# Patient Record
Sex: FEMALE | Race: BLACK OR AFRICAN AMERICAN | NOT HISPANIC OR LATINO | Employment: FULL TIME | URBAN - METROPOLITAN AREA
[De-identification: names, ages, dates, MRNs, and addresses within clinical notes are randomized per-mention and may not be internally consistent; named-entity substitution may affect disease eponyms.]

---

## 2023-04-07 ENCOUNTER — OFFICE VISIT (OUTPATIENT)
Dept: PRIMARY CARE | Facility: CLINIC | Age: 63
End: 2023-04-07
Payer: COMMERCIAL

## 2023-04-07 ENCOUNTER — TELEPHONE (OUTPATIENT)
Dept: PRIMARY CARE | Facility: CLINIC | Age: 63
End: 2023-04-07

## 2023-04-07 VITALS
SYSTOLIC BLOOD PRESSURE: 120 MMHG | TEMPERATURE: 98.5 F | WEIGHT: 187.2 LBS | RESPIRATION RATE: 16 BRPM | HEART RATE: 70 BPM | DIASTOLIC BLOOD PRESSURE: 70 MMHG | BODY MASS INDEX: 36.56 KG/M2

## 2023-04-07 DIAGNOSIS — J32.9 CHRONIC SINUSITIS, UNSPECIFIED LOCATION: ICD-10-CM

## 2023-04-07 DIAGNOSIS — M16.10 HIP ARTHRITIS: ICD-10-CM

## 2023-04-07 DIAGNOSIS — Z79.4 TYPE 2 DIABETES MELLITUS WITH HYPERGLYCEMIA, WITH LONG-TERM CURRENT USE OF INSULIN (MULTI): ICD-10-CM

## 2023-04-07 DIAGNOSIS — E66.01 MORBID OBESITY (MULTI): ICD-10-CM

## 2023-04-07 DIAGNOSIS — E78.49 OTHER HYPERLIPIDEMIA: ICD-10-CM

## 2023-04-07 DIAGNOSIS — I10 PRIMARY HYPERTENSION: Primary | ICD-10-CM

## 2023-04-07 DIAGNOSIS — E11.65 TYPE 2 DIABETES MELLITUS WITH HYPERGLYCEMIA, WITH LONG-TERM CURRENT USE OF INSULIN (MULTI): ICD-10-CM

## 2023-04-07 PROBLEM — N39.46 MIXED INCONTINENCE: Status: ACTIVE | Noted: 2023-04-07

## 2023-04-07 PROBLEM — R23.2 HOT FLASHES: Status: ACTIVE | Noted: 2023-04-07

## 2023-04-07 PROBLEM — M72.2 BILATERAL PLANTAR FASCIITIS: Status: ACTIVE | Noted: 2023-04-07

## 2023-04-07 PROBLEM — F32.A DEPRESSION: Status: ACTIVE | Noted: 2023-04-07

## 2023-04-07 PROBLEM — M35.00 SJOGREN'S SYNDROME (MULTI): Status: ACTIVE | Noted: 2023-04-07

## 2023-04-07 PROBLEM — M06.9 RHEUMATOID ARTHRITIS (MULTI): Status: ACTIVE | Noted: 2023-04-07

## 2023-04-07 PROBLEM — E78.5 HYPERLIPIDEMIA: Status: ACTIVE | Noted: 2023-04-07

## 2023-04-07 PROBLEM — E55.9 VITAMIN D DEFICIENCY: Status: ACTIVE | Noted: 2023-04-07

## 2023-04-07 PROBLEM — H04.123 DRY EYES: Status: ACTIVE | Noted: 2023-04-07

## 2023-04-07 PROBLEM — E11.9 DIABETES MELLITUS, TYPE 2 (MULTI): Status: ACTIVE | Noted: 2023-04-07

## 2023-04-07 PROBLEM — M19.90 ARTHRITIS: Status: ACTIVE | Noted: 2023-04-07

## 2023-04-07 PROBLEM — G47.30 SLEEP APNEA: Status: ACTIVE | Noted: 2023-04-07

## 2023-04-07 PROCEDURE — 3074F SYST BP LT 130 MM HG: CPT | Performed by: INTERNAL MEDICINE

## 2023-04-07 PROCEDURE — 99214 OFFICE O/P EST MOD 30 MIN: CPT | Performed by: INTERNAL MEDICINE

## 2023-04-07 PROCEDURE — 3078F DIAST BP <80 MM HG: CPT | Performed by: INTERNAL MEDICINE

## 2023-04-07 RX ORDER — SEMAGLUTIDE 1.34 MG/ML
INJECTION, SOLUTION SUBCUTANEOUS
COMMUNITY
Start: 2022-06-21 | End: 2023-04-07 | Stop reason: ALTCHOICE

## 2023-04-07 RX ORDER — AMOXICILLIN 875 MG/1
1 TABLET, FILM COATED ORAL 2 TIMES DAILY
COMMUNITY
Start: 2022-12-20

## 2023-04-07 RX ORDER — PILOCARPINE HYDROCHLORIDE 5 MG/1
1 TABLET, FILM COATED ORAL 2 TIMES DAILY
COMMUNITY
Start: 2016-03-18

## 2023-04-07 RX ORDER — DICLOFENAC SODIUM 16.05 MG/ML
SOLUTION TOPICAL
COMMUNITY
Start: 2013-07-23

## 2023-04-07 RX ORDER — ATORVASTATIN CALCIUM 10 MG/1
10 TABLET, FILM COATED ORAL DAILY
COMMUNITY
End: 2023-04-24 | Stop reason: SDUPTHER

## 2023-04-07 RX ORDER — BLOOD-GLUCOSE METER
KIT MISCELLANEOUS 2 TIMES DAILY
COMMUNITY
Start: 2017-05-30

## 2023-04-07 RX ORDER — MIRABEGRON 25 MG/1
TABLET, FILM COATED, EXTENDED RELEASE ORAL
COMMUNITY
Start: 2021-12-17

## 2023-04-07 RX ORDER — COVID-19 ANTIGEN TEST
KIT MISCELLANEOUS
COMMUNITY
Start: 2022-04-22 | End: 2023-07-15

## 2023-04-07 RX ORDER — MOMETASONE FUROATE 50 UG/1
SPRAY, METERED NASAL
COMMUNITY
Start: 2019-03-19 | End: 2023-04-07 | Stop reason: SDUPTHER

## 2023-04-07 RX ORDER — FLUTICASONE PROPIONATE 50 MCG
1 SPRAY, SUSPENSION (ML) NASAL 2 TIMES DAILY
COMMUNITY

## 2023-04-07 RX ORDER — MOMETASONE FUROATE 50 UG/1
2 SPRAY, METERED NASAL DAILY
Qty: 17 G | Refills: 2 | Status: SHIPPED | OUTPATIENT
Start: 2023-04-07 | End: 2024-04-30 | Stop reason: SDUPTHER

## 2023-04-07 RX ORDER — OXYBUTYNIN CHLORIDE 5 MG/1
1 TABLET ORAL DAILY
COMMUNITY
Start: 2022-03-08

## 2023-04-07 RX ORDER — METFORMIN HYDROCHLORIDE 500 MG/1
500 TABLET ORAL 2 TIMES DAILY
COMMUNITY
End: 2023-04-07 | Stop reason: SDUPTHER

## 2023-04-07 RX ORDER — AMLODIPINE AND BENAZEPRIL HYDROCHLORIDE 10; 40 MG/1; MG/1
1 CAPSULE ORAL DAILY
Qty: 30 CAPSULE | Refills: 2 | Status: SHIPPED | OUTPATIENT
Start: 2023-04-07 | End: 2023-04-24 | Stop reason: SDUPTHER

## 2023-04-07 RX ORDER — AMLODIPINE AND BENAZEPRIL HYDROCHLORIDE 10; 40 MG/1; MG/1
1 CAPSULE ORAL DAILY
COMMUNITY
End: 2023-04-07 | Stop reason: SDUPTHER

## 2023-04-07 RX ORDER — METFORMIN HYDROCHLORIDE 500 MG/1
500 TABLET ORAL
Qty: 60 TABLET | Refills: 2 | Status: SHIPPED | OUTPATIENT
Start: 2023-04-07 | End: 2023-04-24 | Stop reason: SDUPTHER

## 2023-04-07 RX ORDER — TRIAMTERENE AND HYDROCHLOROTHIAZIDE 37.5; 25 MG/1; MG/1
1 CAPSULE ORAL DAILY
COMMUNITY
End: 2023-04-24 | Stop reason: SDUPTHER

## 2023-04-07 NOTE — PROGRESS NOTES
Adeola Moody is a 62 y.o. female   Patient with a past medical history of HTN, HLD, DM, RA, Sjogren, Depression, DJD, Colonoscopy in 2026, Mammogram (June)    Has lost weight with ozempic/ eating better         Review of Systems     Constitutional: no fever, no chills, not feeling poorly, not feeling tired and no recent weight gain, no recent weight loss.   ENT: no earache, no hearing loss, no nosebleeds, no nasal discharge, no sore throat and no hoarseness.   Cardiovascular: the heart rate was not slow, the heart rate was not fast, no chest pain, no palpitations, no intermittent leg claudication and no lower extremity edema.   Respiratory: no cough, wheezing or shortness of breath at rest or exertion  Gastrointestinal: no abdominal pain, no constipation, no melena, no nausea, no diarrhea, no vomiting and no blood in stools.   Musculoskeletal: no arthralgias, no myalgias, no back pain, no joint swelling, no joint stiffness, no limb pain and no limb swelling.   Integumentary: no skin rashes, no skin lesions, no itching, no skin wound and no dry skin.   Neurological: no headache, no confusion, no numbness, no dizziness, no tingling and no fainting.   All other systems have been reviewed and are negative for complaint.       Vitals:    04/07/23 0955   Temp: 36.9 °C (98.5 °F)        Physical Exam     Constitutional   General appearance: Alert and in no acute distress.     Pulmonary   Respiratory assessment: No respiratory distress, normal respiratory rhythm and effort.    Auscultation of Lungs: Clear bilateral breath sounds.   Cardiovascular   Auscultation of heart: Apical pulse normal, heart rate and rhythm normal, normal S1 and S2, no murmurs and no pericardial rub.    Exam for edema: No peripheral edema.   Abdomen   Abdominal Exam: No bruits, normal bowel sounds, soft, non-tender, no abdominal mass palpated.    Liver and Spleen exam: No hepato-splenomegaly.   Musculoskeletal   Examination of gait: Normal.     Inspection of digits and nails: No clubbing or cyanosis of the fingernails.    Inspection/palpation of joints, bones and muscles: No joint swelling. Normal movement of all extremities.   Skin   Skin inspection: Normal skin color and pigmentation, normal skin turgor and no visible rash.   Neurologic   Cranial nerves: Nerves 2-12 were intact, no focal neuro defects.     Assessment/Plan          Patient with a past medical history of HTN, HLD, DM, RA, Sjogren, Depression, DJD, Colonoscopy in 2026, Mammogram (June)        # DM/ Morbid Obesity  condition is stable  continue current medications        # HTN  condition is stable  continue current medications        # HLD  condition is stable  continue current medications       # Arthritis  Refer to water therapy  Blood work  f3m

## 2023-04-24 DIAGNOSIS — E78.49 OTHER HYPERLIPIDEMIA: Primary | ICD-10-CM

## 2023-04-24 DIAGNOSIS — I10 PRIMARY HYPERTENSION: ICD-10-CM

## 2023-04-24 DIAGNOSIS — E11.65 TYPE 2 DIABETES MELLITUS WITH HYPERGLYCEMIA, WITH LONG-TERM CURRENT USE OF INSULIN (MULTI): ICD-10-CM

## 2023-04-24 DIAGNOSIS — Z79.4 TYPE 2 DIABETES MELLITUS WITH HYPERGLYCEMIA, WITH LONG-TERM CURRENT USE OF INSULIN (MULTI): ICD-10-CM

## 2023-04-24 RX ORDER — ATORVASTATIN CALCIUM 10 MG/1
10 TABLET, FILM COATED ORAL DAILY
Qty: 90 TABLET | Refills: 0 | Status: SHIPPED | OUTPATIENT
Start: 2023-04-24 | End: 2023-10-23 | Stop reason: SDUPTHER

## 2023-04-24 RX ORDER — AMLODIPINE AND BENAZEPRIL HYDROCHLORIDE 10; 40 MG/1; MG/1
1 CAPSULE ORAL DAILY
Qty: 30 CAPSULE | Refills: 2 | Status: SHIPPED | OUTPATIENT
Start: 2023-04-24 | End: 2024-01-09 | Stop reason: SDUPTHER

## 2023-04-24 RX ORDER — TRIAMTERENE AND HYDROCHLOROTHIAZIDE 37.5; 25 MG/1; MG/1
1 CAPSULE ORAL DAILY
Qty: 90 CAPSULE | Refills: 0 | Status: SHIPPED | OUTPATIENT
Start: 2023-04-24 | End: 2023-07-17 | Stop reason: ALTCHOICE

## 2023-04-24 RX ORDER — METFORMIN HYDROCHLORIDE 500 MG/1
500 TABLET ORAL
Qty: 60 TABLET | Refills: 2 | Status: SHIPPED | OUTPATIENT
Start: 2023-04-24 | End: 2023-07-24

## 2023-04-26 ENCOUNTER — TELEPHONE (OUTPATIENT)
Dept: PRIMARY CARE | Facility: CLINIC | Age: 63
End: 2023-04-26

## 2023-04-26 ENCOUNTER — APPOINTMENT (OUTPATIENT)
Dept: LAB | Facility: LAB | Age: 63
End: 2023-04-26
Payer: COMMERCIAL

## 2023-04-26 DIAGNOSIS — K92.1 BLOOD IN STOOL: ICD-10-CM

## 2023-04-26 NOTE — TELEPHONE ENCOUNTER
Pt called in and stated that she needs a referral to Gastroentology for blood in her stool and stomach pains

## 2023-04-27 ENCOUNTER — LAB (OUTPATIENT)
Dept: LAB | Facility: LAB | Age: 63
End: 2023-04-27
Payer: COMMERCIAL

## 2023-04-27 DIAGNOSIS — E78.49 OTHER HYPERLIPIDEMIA: ICD-10-CM

## 2023-04-27 DIAGNOSIS — E11.65 TYPE 2 DIABETES MELLITUS WITH HYPERGLYCEMIA, WITH LONG-TERM CURRENT USE OF INSULIN (MULTI): ICD-10-CM

## 2023-04-27 DIAGNOSIS — I10 PRIMARY HYPERTENSION: ICD-10-CM

## 2023-04-27 DIAGNOSIS — Z79.4 TYPE 2 DIABETES MELLITUS WITH HYPERGLYCEMIA, WITH LONG-TERM CURRENT USE OF INSULIN (MULTI): ICD-10-CM

## 2023-04-27 PROCEDURE — 82570 ASSAY OF URINE CREATININE: CPT

## 2023-04-27 PROCEDURE — 80053 COMPREHEN METABOLIC PANEL: CPT

## 2023-04-27 PROCEDURE — 80061 LIPID PANEL: CPT

## 2023-04-27 PROCEDURE — 84443 ASSAY THYROID STIM HORMONE: CPT

## 2023-04-27 PROCEDURE — 83036 HEMOGLOBIN GLYCOSYLATED A1C: CPT

## 2023-04-27 PROCEDURE — 36415 COLL VENOUS BLD VENIPUNCTURE: CPT

## 2023-04-27 PROCEDURE — 85027 COMPLETE CBC AUTOMATED: CPT

## 2023-04-27 PROCEDURE — 82043 UR ALBUMIN QUANTITATIVE: CPT

## 2023-04-28 LAB
ALANINE AMINOTRANSFERASE (SGPT) (U/L) IN SER/PLAS: 22 U/L (ref 7–45)
ALBUMIN (G/DL) IN SER/PLAS: 4.4 G/DL (ref 3.4–5)
ALBUMIN (MG/L) IN URINE: 10.5 MG/L
ALBUMIN/CREATININE (UG/MG) IN URINE: 3.9 UG/MG CRT (ref 0–30)
ALKALINE PHOSPHATASE (U/L) IN SER/PLAS: 56 U/L (ref 33–136)
ANION GAP IN SER/PLAS: 12 MMOL/L (ref 10–20)
ASPARTATE AMINOTRANSFERASE (SGOT) (U/L) IN SER/PLAS: 18 U/L (ref 9–39)
BILIRUBIN TOTAL (MG/DL) IN SER/PLAS: 0.5 MG/DL (ref 0–1.2)
CALCIUM (MG/DL) IN SER/PLAS: 9.7 MG/DL (ref 8.6–10.6)
CARBON DIOXIDE, TOTAL (MMOL/L) IN SER/PLAS: 27 MMOL/L (ref 21–32)
CHLORIDE (MMOL/L) IN SER/PLAS: 105 MMOL/L (ref 98–107)
CHOLESTEROL (MG/DL) IN SER/PLAS: 195 MG/DL (ref 0–199)
CHOLESTEROL IN HDL (MG/DL) IN SER/PLAS: 47.3 MG/DL
CHOLESTEROL/HDL RATIO: 4.1
CREATININE (MG/DL) IN SER/PLAS: 0.83 MG/DL (ref 0.5–1.05)
CREATININE (MG/DL) IN URINE: 268 MG/DL (ref 20–320)
ERYTHROCYTE DISTRIBUTION WIDTH (RATIO) BY AUTOMATED COUNT: 13.8 % (ref 11.5–14.5)
ERYTHROCYTE MEAN CORPUSCULAR HEMOGLOBIN CONCENTRATION (G/DL) BY AUTOMATED: 32.6 G/DL (ref 32–36)
ERYTHROCYTE MEAN CORPUSCULAR VOLUME (FL) BY AUTOMATED COUNT: 95 FL (ref 80–100)
ERYTHROCYTES (10*6/UL) IN BLOOD BY AUTOMATED COUNT: 3.65 X10E12/L (ref 4–5.2)
ESTIMATED AVERAGE GLUCOSE FOR HBA1C: 120 MG/DL
GFR FEMALE: 79 ML/MIN/1.73M2
GLUCOSE (MG/DL) IN SER/PLAS: 75 MG/DL (ref 74–99)
HEMATOCRIT (%) IN BLOOD BY AUTOMATED COUNT: 34.7 % (ref 36–46)
HEMOGLOBIN (G/DL) IN BLOOD: 11.3 G/DL (ref 12–16)
HEMOGLOBIN A1C/HEMOGLOBIN TOTAL IN BLOOD: 5.8 %
LDL: 130 MG/DL (ref 0–99)
LEUKOCYTES (10*3/UL) IN BLOOD BY AUTOMATED COUNT: 5 X10E9/L (ref 4.4–11.3)
NRBC (PER 100 WBCS) BY AUTOMATED COUNT: 0 /100 WBC (ref 0–0)
PLATELETS (10*3/UL) IN BLOOD AUTOMATED COUNT: 222 X10E9/L (ref 150–450)
POTASSIUM (MMOL/L) IN SER/PLAS: 4.2 MMOL/L (ref 3.5–5.3)
PROTEIN TOTAL: 7.6 G/DL (ref 6.4–8.2)
SODIUM (MMOL/L) IN SER/PLAS: 140 MMOL/L (ref 136–145)
THYROTROPIN (MIU/L) IN SER/PLAS BY DETECTION LIMIT <= 0.05 MIU/L: 1.93 MIU/L (ref 0.44–3.98)
TRIGLYCERIDE (MG/DL) IN SER/PLAS: 89 MG/DL (ref 0–149)
UREA NITROGEN (MG/DL) IN SER/PLAS: 14 MG/DL (ref 6–23)
VLDL: 18 MG/DL (ref 0–40)

## 2023-06-01 ENCOUNTER — HOSPITAL ENCOUNTER (OUTPATIENT)
Dept: DATA CONVERSION | Facility: HOSPITAL | Age: 63
End: 2023-06-01
Attending: INTERNAL MEDICINE | Admitting: INTERNAL MEDICINE
Payer: COMMERCIAL

## 2023-06-01 DIAGNOSIS — Z88.2 ALLERGY STATUS TO SULFONAMIDES: ICD-10-CM

## 2023-06-01 DIAGNOSIS — K64.4 RESIDUAL HEMORRHOIDAL SKIN TAGS: ICD-10-CM

## 2023-06-01 DIAGNOSIS — I10 ESSENTIAL (PRIMARY) HYPERTENSION: ICD-10-CM

## 2023-06-01 DIAGNOSIS — Z86.010 PERSONAL HISTORY OF COLONIC POLYPS: ICD-10-CM

## 2023-06-01 DIAGNOSIS — M35.00 SJOGREN SYNDROME, UNSPECIFIED (MULTI): ICD-10-CM

## 2023-06-01 DIAGNOSIS — K64.8 OTHER HEMORRHOIDS: ICD-10-CM

## 2023-06-01 DIAGNOSIS — K62.5 HEMORRHAGE OF ANUS AND RECTUM: ICD-10-CM

## 2023-06-01 DIAGNOSIS — E11.9 TYPE 2 DIABETES MELLITUS WITHOUT COMPLICATIONS (MULTI): ICD-10-CM

## 2023-06-01 DIAGNOSIS — Z79.84 LONG TERM (CURRENT) USE OF ORAL HYPOGLYCEMIC DRUGS: ICD-10-CM

## 2023-06-01 DIAGNOSIS — K57.30 DIVERTICULOSIS OF LARGE INTESTINE WITHOUT PERFORATION OR ABSCESS WITHOUT BLEEDING: ICD-10-CM

## 2023-06-01 DIAGNOSIS — J45.909 UNSPECIFIED ASTHMA, UNCOMPLICATED (HHS-HCC): ICD-10-CM

## 2023-06-01 DIAGNOSIS — Z79.85 LONG-TERM (CURRENT) USE OF INJECTABLE NON-INSULIN ANTIDIABETIC DRUGS: ICD-10-CM

## 2023-06-01 DIAGNOSIS — R10.13 EPIGASTRIC PAIN: ICD-10-CM

## 2023-06-01 DIAGNOSIS — E66.01 MORBID (SEVERE) OBESITY DUE TO EXCESS CALORIES (MULTI): ICD-10-CM

## 2023-06-01 DIAGNOSIS — E78.5 HYPERLIPIDEMIA, UNSPECIFIED: ICD-10-CM

## 2023-06-01 DIAGNOSIS — G47.30 SLEEP APNEA, UNSPECIFIED: ICD-10-CM

## 2023-06-01 DIAGNOSIS — K92.1 MELENA: ICD-10-CM

## 2023-06-01 DIAGNOSIS — Z91.040 LATEX ALLERGY STATUS: ICD-10-CM

## 2023-06-01 LAB — POCT GLUCOSE: 90 MG/DL (ref 74–99)

## 2023-06-09 LAB
COMPLETE PATHOLOGY REPORT: NORMAL
CONVERTED CLINICAL DIAGNOSIS-HISTORY: NORMAL
CONVERTED FINAL DIAGNOSIS: NORMAL
CONVERTED FINAL REPORT PDF LINK TO COPY AND PASTE: NORMAL
CONVERTED GROSS DESCRIPTION: NORMAL

## 2023-07-15 PROBLEM — J18.9 CAP (COMMUNITY ACQUIRED PNEUMONIA): Status: ACTIVE | Noted: 2023-07-15

## 2023-07-15 PROBLEM — R32 URINARY INCONTINENCE: Status: ACTIVE | Noted: 2023-07-15

## 2023-07-15 PROBLEM — F41.9 ANXIETY: Status: ACTIVE | Noted: 2023-07-15

## 2023-07-15 PROBLEM — N95.1 PERIMENOPAUSE: Status: ACTIVE | Noted: 2023-07-15

## 2023-07-15 PROBLEM — F51.02 TRANSIENT INSOMNIA: Status: ACTIVE | Noted: 2023-07-15

## 2023-07-15 PROBLEM — R39.15 URINARY URGENCY: Status: ACTIVE | Noted: 2023-07-15

## 2023-07-15 PROBLEM — M77.8 TENDINITIS OF RIGHT SHOULDER: Status: ACTIVE | Noted: 2023-07-15

## 2023-07-15 PROBLEM — N95.0 PMB (POSTMENOPAUSAL BLEEDING): Status: ACTIVE | Noted: 2023-07-15

## 2023-07-15 PROBLEM — L30.9 DERMATITIS: Status: ACTIVE | Noted: 2023-07-15

## 2023-07-15 PROBLEM — R25.2 MUSCLE CRAMPS: Status: ACTIVE | Noted: 2023-07-15

## 2023-07-15 PROBLEM — M72.2 PLANTAR FASCIITIS: Status: ACTIVE | Noted: 2023-07-15

## 2023-07-15 PROBLEM — R74.01 TRANSAMINITIS: Status: ACTIVE | Noted: 2023-07-15

## 2023-07-15 PROBLEM — R51.9 CHRONIC HEADACHE: Status: ACTIVE | Noted: 2023-07-15

## 2023-07-15 PROBLEM — R20.2 NUMBNESS AND TINGLING OF FOOT: Status: ACTIVE | Noted: 2023-07-15

## 2023-07-15 PROBLEM — G89.29 CHRONIC HEADACHE: Status: ACTIVE | Noted: 2023-07-15

## 2023-07-15 PROBLEM — R73.9 ELEVATED BLOOD SUGAR: Status: ACTIVE | Noted: 2023-07-15

## 2023-07-15 PROBLEM — G57.00 PIRIFORMIS SYNDROME: Status: ACTIVE | Noted: 2023-07-15

## 2023-07-15 PROBLEM — R53.83 FATIGUE: Status: ACTIVE | Noted: 2023-07-15

## 2023-07-15 PROBLEM — J30.9 ALLERGIC RHINITIS: Status: ACTIVE | Noted: 2023-07-15

## 2023-07-15 PROBLEM — M54.30 ACUTE SCIATICA: Status: ACTIVE | Noted: 2023-07-15

## 2023-07-15 PROBLEM — R07.9 CHEST PAIN: Status: ACTIVE | Noted: 2023-07-15

## 2023-07-15 PROBLEM — N92.6 IRREGULAR MENSTRUAL CYCLE: Status: ACTIVE | Noted: 2023-07-15

## 2023-07-15 PROBLEM — N89.8 PRURITUS OF VAGINA: Status: ACTIVE | Noted: 2023-07-15

## 2023-07-15 PROBLEM — R76.8 POSITIVE ANA (ANTINUCLEAR ANTIBODY): Status: ACTIVE | Noted: 2023-07-15

## 2023-07-15 PROBLEM — R10.13 DYSPEPSIA: Status: ACTIVE | Noted: 2023-07-15

## 2023-07-15 PROBLEM — K62.5 BRBPR (BRIGHT RED BLOOD PER RECTUM): Status: ACTIVE | Noted: 2023-07-15

## 2023-07-15 PROBLEM — N89.8 VAGINAL DRYNESS: Status: ACTIVE | Noted: 2023-07-15

## 2023-07-15 PROBLEM — R20.0 NUMBNESS AND TINGLING OF FOOT: Status: ACTIVE | Noted: 2023-07-15

## 2023-07-15 PROBLEM — M70.60 TROCHANTERIC BURSITIS: Status: ACTIVE | Noted: 2023-07-15

## 2023-07-15 PROBLEM — N95.2 VAGINAL ATROPHY: Status: ACTIVE | Noted: 2023-07-15

## 2023-07-15 PROBLEM — R79.89 ELEVATED LIVER FUNCTION TESTS: Status: ACTIVE | Noted: 2023-07-15

## 2023-07-15 PROBLEM — N95.1 MENOPAUSAL SYMPTOMS: Status: ACTIVE | Noted: 2023-07-15

## 2023-07-15 RX ORDER — METHOCARBAMOL 750 MG/1
750 TABLET, FILM COATED ORAL 4 TIMES DAILY
COMMUNITY
Start: 2017-08-20

## 2023-07-15 RX ORDER — GABAPENTIN 100 MG/1
CAPSULE ORAL
COMMUNITY
Start: 2015-11-03

## 2023-07-15 RX ORDER — ESTRADIOL 0.1 MG/G
CREAM VAGINAL
COMMUNITY
Start: 2020-02-07

## 2023-07-15 RX ORDER — FUROSEMIDE 20 MG/1
TABLET ORAL
COMMUNITY
Start: 2015-11-01

## 2023-07-15 RX ORDER — NAPROXEN 500 MG/1
1 TABLET ORAL
COMMUNITY
Start: 2019-08-17

## 2023-07-15 RX ORDER — CITALOPRAM 40 MG/1
TABLET, FILM COATED ORAL
COMMUNITY
Start: 2012-05-27 | End: 2024-04-30 | Stop reason: ALTCHOICE

## 2023-07-17 ENCOUNTER — OFFICE VISIT (OUTPATIENT)
Dept: PRIMARY CARE | Facility: CLINIC | Age: 63
End: 2023-07-17
Payer: COMMERCIAL

## 2023-07-17 VITALS
DIASTOLIC BLOOD PRESSURE: 66 MMHG | BODY MASS INDEX: 36.01 KG/M2 | OXYGEN SATURATION: 100 % | SYSTOLIC BLOOD PRESSURE: 102 MMHG | HEART RATE: 88 BPM | WEIGHT: 184.4 LBS

## 2023-07-17 DIAGNOSIS — Z79.4 TYPE 2 DIABETES MELLITUS WITH HYPERGLYCEMIA, WITH LONG-TERM CURRENT USE OF INSULIN (MULTI): ICD-10-CM

## 2023-07-17 DIAGNOSIS — E78.49 OTHER HYPERLIPIDEMIA: Primary | ICD-10-CM

## 2023-07-17 DIAGNOSIS — E11.65 TYPE 2 DIABETES MELLITUS WITH HYPERGLYCEMIA, WITH LONG-TERM CURRENT USE OF INSULIN (MULTI): ICD-10-CM

## 2023-07-17 DIAGNOSIS — I10 PRIMARY HYPERTENSION: ICD-10-CM

## 2023-07-17 DIAGNOSIS — N39.3 URINE, INCONTINENCE, STRESS FEMALE: ICD-10-CM

## 2023-07-17 DIAGNOSIS — Z12.31 SCREENING MAMMOGRAM, ENCOUNTER FOR: ICD-10-CM

## 2023-07-17 PROCEDURE — 3078F DIAST BP <80 MM HG: CPT | Performed by: INTERNAL MEDICINE

## 2023-07-17 PROCEDURE — 3074F SYST BP LT 130 MM HG: CPT | Performed by: INTERNAL MEDICINE

## 2023-07-17 PROCEDURE — 3044F HG A1C LEVEL LT 7.0%: CPT | Performed by: INTERNAL MEDICINE

## 2023-07-17 PROCEDURE — 3008F BODY MASS INDEX DOCD: CPT | Performed by: INTERNAL MEDICINE

## 2023-07-17 PROCEDURE — 99214 OFFICE O/P EST MOD 30 MIN: CPT | Performed by: INTERNAL MEDICINE

## 2023-07-17 ASSESSMENT — PATIENT HEALTH QUESTIONNAIRE - PHQ9
2. FEELING DOWN, DEPRESSED OR HOPELESS: NOT AT ALL
SUM OF ALL RESPONSES TO PHQ9 QUESTIONS 1 AND 2: 0
1. LITTLE INTEREST OR PLEASURE IN DOING THINGS: NOT AT ALL

## 2023-07-17 NOTE — PROGRESS NOTES
Adeola Moody is a 63 y.o. female   Patient with a past medical history of HTN, HLD, DM, RA, Sjogren, Depression, DJD, Colonoscopy in 2030, Mammogram (June)    Has lost weight with ozempic/ eating better    Feels fine  No chest pain/  SOB/ dizziness  BM OK  Energy level ok  Appetite OK      No polyuria  No polydipsia  Nocturia            Review of Systems     Constitutional: no fever, no chills, not feeling poorly, not feeling tired and no recent weight gain, no recent weight loss.   ENT: no earache, no hearing loss, no nosebleeds, no nasal discharge, no sore throat and no hoarseness.   Cardiovascular: the heart rate was not slow, the heart rate was not fast, no chest pain, no palpitations, no intermittent leg claudication and no lower extremity edema.   Respiratory: no cough, wheezing or shortness of breath at rest or exertion  Gastrointestinal: no abdominal pain, no constipation, no melena, no nausea, no diarrhea, no vomiting and no blood in stools.   Musculoskeletal: no arthralgias, no myalgias, no back pain, no joint swelling, no joint stiffness, no limb pain and no limb swelling.   Integumentary: no skin rashes, no skin lesions, no itching, no skin wound and no dry skin.   Neurological: no headache, no confusion, no numbness, no dizziness, no tingling and no fainting.   All other systems have been reviewed and are negative for complaint.       Vitals:    07/17/23 1235   BP: 102/66   Pulse: 88   SpO2: 100%        Physical Exam     Constitutional   General appearance: Alert and in no acute distress.     Pulmonary   Respiratory assessment: No respiratory distress, normal respiratory rhythm and effort.    Auscultation of Lungs: Clear bilateral breath sounds.   Cardiovascular   Auscultation of heart: Apical pulse normal, heart rate and rhythm normal, normal S1 and S2, no murmurs and no pericardial rub.    Exam for edema: No peripheral edema.   Abdomen   Abdominal Exam: No bruits, normal bowel sounds, soft,  non-tender, no abdominal mass palpated.    Liver and Spleen exam: No hepato-splenomegaly.   Musculoskeletal   Examination of gait: Normal.    Inspection of digits and nails: No clubbing or cyanosis of the fingernails.    Inspection/palpation of joints, bones and muscles: No joint swelling. Normal movement of all extremities.   Skin   Skin inspection: Normal skin color and pigmentation, normal skin turgor and no visible rash.   Neurologic   Cranial nerves: Nerves 2-12 were intact, no focal neuro defects.     Assessment/Plan          Patient with a past medical history of HTN, HLD, DM, RA, Sjogren, Depression, DJD, Colonoscopy in 2030, Mammogram (June)        # DM/ Morbid Obesity  Continue Ozempic        # HTN  DC Maxzide as BP low        # HLD  condition is stable  continue current medications       # Arthritis  Stable    Refer to UroGyn for incontinence

## 2023-07-22 DIAGNOSIS — Z79.4 TYPE 2 DIABETES MELLITUS WITH HYPERGLYCEMIA, WITH LONG-TERM CURRENT USE OF INSULIN (MULTI): ICD-10-CM

## 2023-07-22 DIAGNOSIS — E11.65 TYPE 2 DIABETES MELLITUS WITH HYPERGLYCEMIA, WITH LONG-TERM CURRENT USE OF INSULIN (MULTI): ICD-10-CM

## 2023-07-24 RX ORDER — METFORMIN HYDROCHLORIDE 500 MG/1
500 TABLET ORAL
Qty: 60 TABLET | Refills: 2 | Status: SHIPPED | OUTPATIENT
Start: 2023-07-24 | End: 2023-10-23 | Stop reason: ALTCHOICE

## 2023-09-15 ENCOUNTER — TELEPHONE (OUTPATIENT)
Dept: PRIMARY CARE | Facility: CLINIC | Age: 63
End: 2023-09-15

## 2023-09-15 NOTE — TELEPHONE ENCOUNTER
PT called stating that she wanted to get an appointment today with Dr Teague, but he doesn't have anything until October and that none of the doctors have same day appointments available. 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  Advised the PT to go to urgent care.    She also needs a new handicap plaque prescription because her one now expires at the end of next month.

## 2023-10-05 DIAGNOSIS — E11.65 TYPE 2 DIABETES MELLITUS WITH HYPERGLYCEMIA, WITH LONG-TERM CURRENT USE OF INSULIN (MULTI): ICD-10-CM

## 2023-10-05 DIAGNOSIS — Z79.4 TYPE 2 DIABETES MELLITUS WITH HYPERGLYCEMIA, WITH LONG-TERM CURRENT USE OF INSULIN (MULTI): ICD-10-CM

## 2023-10-05 NOTE — TELEPHONE ENCOUNTER
Rx Refill Request Telephone Encounter    Name:  Adeola Moody  :  [id number]  Specific Pharmacy location:  [facility]

## 2023-10-09 DIAGNOSIS — Z79.4 TYPE 2 DIABETES MELLITUS WITH HYPERGLYCEMIA, WITH LONG-TERM CURRENT USE OF INSULIN (MULTI): ICD-10-CM

## 2023-10-09 DIAGNOSIS — E11.65 TYPE 2 DIABETES MELLITUS WITH HYPERGLYCEMIA, WITH LONG-TERM CURRENT USE OF INSULIN (MULTI): ICD-10-CM

## 2023-10-10 RX ORDER — SEMAGLUTIDE 1.34 MG/ML
1 INJECTION, SOLUTION SUBCUTANEOUS
Qty: 6 ML | Refills: 0 | Status: SHIPPED | OUTPATIENT
Start: 2023-10-10 | End: 2024-01-09 | Stop reason: SDUPTHER

## 2023-10-23 ENCOUNTER — OFFICE VISIT (OUTPATIENT)
Dept: PRIMARY CARE | Facility: CLINIC | Age: 63
End: 2023-10-23
Payer: COMMERCIAL

## 2023-10-23 VITALS
TEMPERATURE: 98.4 F | HEART RATE: 68 BPM | BODY MASS INDEX: 35.47 KG/M2 | DIASTOLIC BLOOD PRESSURE: 80 MMHG | WEIGHT: 181.6 LBS | SYSTOLIC BLOOD PRESSURE: 130 MMHG | RESPIRATION RATE: 16 BRPM

## 2023-10-23 DIAGNOSIS — Z79.4 TYPE 2 DIABETES MELLITUS WITH HYPERGLYCEMIA, WITH LONG-TERM CURRENT USE OF INSULIN (MULTI): ICD-10-CM

## 2023-10-23 DIAGNOSIS — E13.69 OTHER SPECIFIED DIABETES MELLITUS WITH OTHER SPECIFIED COMPLICATION, UNSPECIFIED WHETHER LONG TERM INSULIN USE (MULTI): ICD-10-CM

## 2023-10-23 DIAGNOSIS — E78.49 OTHER HYPERLIPIDEMIA: ICD-10-CM

## 2023-10-23 DIAGNOSIS — R92.8 ABNORMAL MAMMOGRAM: Primary | ICD-10-CM

## 2023-10-23 DIAGNOSIS — M16.10 HIP ARTHRITIS: ICD-10-CM

## 2023-10-23 DIAGNOSIS — E11.65 TYPE 2 DIABETES MELLITUS WITH HYPERGLYCEMIA, WITH LONG-TERM CURRENT USE OF INSULIN (MULTI): ICD-10-CM

## 2023-10-23 DIAGNOSIS — I10 PRIMARY HYPERTENSION: ICD-10-CM

## 2023-10-23 LAB
POC FINGERSTICK BLOOD GLUCOSE: 104 MG/DL (ref 70–100)
POC HDL CHOLESTEROL: 68 MG/DL (ref 0–50)
POC HEMOGLOBIN A1C: 5.9 % (ref 4.2–6.5)
POC LDL CHOLESTEROL: 112 MG/DL (ref 0–100)
POC NON-HDL CHOLESTEROL: 129 MG/DL (ref 0–130)
POC TOTAL CHOLESTEROL/HDL RATIO: 2.9 (ref 0–4.5)
POC TOTAL CHOLESTEROL: 197 MG/DL (ref 0–199)
POC TRIGLYCERIDES: 88 MG/DL (ref 0–150)

## 2023-10-23 PROCEDURE — 3075F SYST BP GE 130 - 139MM HG: CPT | Performed by: INTERNAL MEDICINE

## 2023-10-23 PROCEDURE — 3079F DIAST BP 80-89 MM HG: CPT | Performed by: INTERNAL MEDICINE

## 2023-10-23 PROCEDURE — 80061 LIPID PANEL: CPT | Performed by: INTERNAL MEDICINE

## 2023-10-23 PROCEDURE — 83036 HEMOGLOBIN GLYCOSYLATED A1C: CPT | Performed by: INTERNAL MEDICINE

## 2023-10-23 PROCEDURE — 99214 OFFICE O/P EST MOD 30 MIN: CPT | Performed by: INTERNAL MEDICINE

## 2023-10-23 PROCEDURE — 82962 GLUCOSE BLOOD TEST: CPT | Performed by: INTERNAL MEDICINE

## 2023-10-23 PROCEDURE — 3044F HG A1C LEVEL LT 7.0%: CPT | Performed by: INTERNAL MEDICINE

## 2023-10-23 RX ORDER — ATORVASTATIN CALCIUM 10 MG/1
20 TABLET, FILM COATED ORAL DAILY
Qty: 90 TABLET | Refills: 0 | Status: SHIPPED | OUTPATIENT
Start: 2023-10-23 | End: 2024-01-25 | Stop reason: ALTCHOICE

## 2023-10-23 NOTE — PROGRESS NOTES
Adeola Moody is a 63 y.o. female   Patient with a past medical history of HTN, HLD, DM, RA, Sjogren, Depression, DJD, Colonoscopy in 2030, Mammogram (June)    Has lost weight with ozempic/ eating better    Feels fine  No chest pain/  SOB/ dizziness  BM OK  Energy level ok  Appetite OK      No polyuria  No polydipsia  Nocturia            Review of Systems     Constitutional: no fever, no chills, not feeling poorly, not feeling tired and no recent weight gain, no recent weight loss.   ENT: no earache, no hearing loss, no nosebleeds, no nasal discharge, no sore throat and no hoarseness.   Cardiovascular: the heart rate was not slow, the heart rate was not fast, no chest pain, no palpitations, no intermittent leg claudication and no lower extremity edema.   Respiratory: no cough, wheezing or shortness of breath at rest or exertion  Gastrointestinal: no abdominal pain, no constipation, no melena, no nausea, no diarrhea, no vomiting and no blood in stools.   Musculoskeletal: no arthralgias, no myalgias, no back pain, no joint swelling, no joint stiffness, no limb pain and no limb swelling.   Integumentary: no skin rashes, no skin lesions, no itching, no skin wound and no dry skin.   Neurological: no headache, no confusion, no numbness, no dizziness, no tingling and no fainting.   All other systems have been reviewed and are negative for complaint.       Vitals:    10/23/23 1244   Temp: 36.9 °C (98.4 °F)        Physical Exam     Constitutional   General appearance: Alert and in no acute distress.     Pulmonary   Respiratory assessment: No respiratory distress, normal respiratory rhythm and effort.    Auscultation of Lungs: Clear bilateral breath sounds.   Cardiovascular   Auscultation of heart: Apical pulse normal, heart rate and rhythm normal, normal S1 and S2, no murmurs and no pericardial rub.    Exam for edema: No peripheral edema.   Abdomen   Abdominal Exam: No bruits, normal bowel sounds, soft, non-tender,  no abdominal mass palpated.    Liver and Spleen exam: No hepato-splenomegaly.   Musculoskeletal   Examination of gait: Normal.    Inspection of digits and nails: No clubbing or cyanosis of the fingernails.    Inspection/palpation of joints, bones and muscles: No joint swelling. Normal movement of all extremities.   Skin   Skin inspection: Normal skin color and pigmentation, normal skin turgor and no visible rash.   Neurologic   Cranial nerves: Nerves 2-12 were intact, no focal neuro defects.     Assessment/Plan          Patient with a past medical history of HTN, HLD, DM, RA, Sjogren, Depression, DJD, Colonoscopy in 2030, Mammogram (June)        # DM/ Morbid Obesity  Continue Ozempic  Has lost weight  Does get occasional stomach discomfort  DC Metfromin        # HTN  stable        # HLD  Elevated  Increase Atorva to 20 mg   Watch salt, avoid excessive caffeine  Avoid processed meats/ sugars/juices Instead eat fresh fruit  Add walnuts and almonds to your diet  exercise 6 days a week for 30 minutes     # Abnormal mammogram  Breast USG    # Arthritis  Stable    # Urinary Incontinence  Will see UroGYN

## 2023-11-22 ENCOUNTER — ANCILLARY PROCEDURE (OUTPATIENT)
Dept: RADIOLOGY | Facility: CLINIC | Age: 63
End: 2023-11-22
Payer: COMMERCIAL

## 2023-11-22 DIAGNOSIS — R92.8 ABNORMAL MAMMOGRAM: ICD-10-CM

## 2023-11-22 PROCEDURE — 76642 ULTRASOUND BREAST LIMITED: CPT | Mod: RT

## 2023-11-22 PROCEDURE — 76642 ULTRASOUND BREAST LIMITED: CPT | Mod: RIGHT SIDE | Performed by: STUDENT IN AN ORGANIZED HEALTH CARE EDUCATION/TRAINING PROGRAM

## 2023-11-22 PROCEDURE — 77065 DX MAMMO INCL CAD UNI: CPT | Mod: RIGHT SIDE | Performed by: STUDENT IN AN ORGANIZED HEALTH CARE EDUCATION/TRAINING PROGRAM

## 2023-11-22 PROCEDURE — 77061 BREAST TOMOSYNTHESIS UNI: CPT | Mod: RIGHT SIDE | Performed by: STUDENT IN AN ORGANIZED HEALTH CARE EDUCATION/TRAINING PROGRAM

## 2023-11-22 PROCEDURE — 77061 BREAST TOMOSYNTHESIS UNI: CPT | Mod: RT

## 2023-12-29 DIAGNOSIS — E78.49 OTHER HYPERLIPIDEMIA: ICD-10-CM

## 2023-12-29 RX ORDER — ATORVASTATIN CALCIUM 20 MG/1
20 TABLET, FILM COATED ORAL DAILY
Qty: 90 TABLET | Refills: 0 | Status: SHIPPED | OUTPATIENT
Start: 2023-12-29 | End: 2024-01-25 | Stop reason: SDUPTHER

## 2024-01-09 DIAGNOSIS — E11.65 TYPE 2 DIABETES MELLITUS WITH HYPERGLYCEMIA, WITH LONG-TERM CURRENT USE OF INSULIN (MULTI): ICD-10-CM

## 2024-01-09 DIAGNOSIS — Z79.4 TYPE 2 DIABETES MELLITUS WITH HYPERGLYCEMIA, WITH LONG-TERM CURRENT USE OF INSULIN (MULTI): ICD-10-CM

## 2024-01-09 DIAGNOSIS — I10 PRIMARY HYPERTENSION: ICD-10-CM

## 2024-01-09 RX ORDER — SEMAGLUTIDE 1.34 MG/ML
1 INJECTION, SOLUTION SUBCUTANEOUS
Qty: 6 ML | Refills: 0 | Status: SHIPPED | OUTPATIENT
Start: 2024-01-09 | End: 2024-01-25 | Stop reason: SDUPTHER

## 2024-01-09 RX ORDER — AMLODIPINE AND BENAZEPRIL HYDROCHLORIDE 10; 40 MG/1; MG/1
1 CAPSULE ORAL DAILY
Qty: 30 CAPSULE | Refills: 2 | Status: SHIPPED | OUTPATIENT
Start: 2024-01-09 | End: 2024-03-29 | Stop reason: SDUPTHER

## 2024-01-25 ENCOUNTER — OFFICE VISIT (OUTPATIENT)
Dept: PRIMARY CARE | Facility: CLINIC | Age: 64
End: 2024-01-25
Payer: COMMERCIAL

## 2024-01-25 VITALS
HEART RATE: 70 BPM | WEIGHT: 178.8 LBS | SYSTOLIC BLOOD PRESSURE: 130 MMHG | RESPIRATION RATE: 16 BRPM | BODY MASS INDEX: 34.92 KG/M2 | DIASTOLIC BLOOD PRESSURE: 70 MMHG

## 2024-01-25 DIAGNOSIS — E66.01 MORBID OBESITY (MULTI): ICD-10-CM

## 2024-01-25 DIAGNOSIS — I10 PRIMARY HYPERTENSION: ICD-10-CM

## 2024-01-25 DIAGNOSIS — J45.20 MILD INTERMITTENT ASTHMA WITHOUT COMPLICATION (HHS-HCC): Primary | ICD-10-CM

## 2024-01-25 DIAGNOSIS — Z79.4 TYPE 2 DIABETES MELLITUS WITH HYPERGLYCEMIA, WITH LONG-TERM CURRENT USE OF INSULIN (MULTI): ICD-10-CM

## 2024-01-25 DIAGNOSIS — E11.65 TYPE 2 DIABETES MELLITUS WITH HYPERGLYCEMIA, WITH LONG-TERM CURRENT USE OF INSULIN (MULTI): ICD-10-CM

## 2024-01-25 DIAGNOSIS — M06.9 RHEUMATOID ARTHRITIS, INVOLVING UNSPECIFIED SITE, UNSPECIFIED WHETHER RHEUMATOID FACTOR PRESENT (MULTI): ICD-10-CM

## 2024-01-25 DIAGNOSIS — E78.49 OTHER HYPERLIPIDEMIA: ICD-10-CM

## 2024-01-25 DIAGNOSIS — E13.69 OTHER SPECIFIED DIABETES MELLITUS WITH OTHER SPECIFIED COMPLICATION, UNSPECIFIED WHETHER LONG TERM INSULIN USE (MULTI): ICD-10-CM

## 2024-01-25 LAB
POC FINGERSTICK BLOOD GLUCOSE: 94 MG/DL (ref 70–100)
POC HDL CHOLESTEROL: 51 MG/DL (ref 0–50)
POC HEMOGLOBIN A1C: 5.9 % (ref 4.2–6.5)
POC LDL CHOLESTEROL: 104 MG/DL (ref 0–100)
POC NON-HDL CHOLESTEROL: 127 MG/DL (ref 0–130)
POC TOTAL CHOLESTEROL/HDL RATIO: 3.5 (ref 0–4.5)
POC TOTAL CHOLESTEROL: 178 MG/DL (ref 0–199)
POC TRIGLYCERIDES: 114 MG/DL (ref 0–150)

## 2024-01-25 PROCEDURE — 82962 GLUCOSE BLOOD TEST: CPT | Performed by: INTERNAL MEDICINE

## 2024-01-25 PROCEDURE — 3078F DIAST BP <80 MM HG: CPT | Performed by: INTERNAL MEDICINE

## 2024-01-25 PROCEDURE — 83036 HEMOGLOBIN GLYCOSYLATED A1C: CPT | Performed by: INTERNAL MEDICINE

## 2024-01-25 PROCEDURE — 3008F BODY MASS INDEX DOCD: CPT | Performed by: INTERNAL MEDICINE

## 2024-01-25 PROCEDURE — 1036F TOBACCO NON-USER: CPT | Performed by: INTERNAL MEDICINE

## 2024-01-25 PROCEDURE — 80061 LIPID PANEL: CPT | Performed by: INTERNAL MEDICINE

## 2024-01-25 PROCEDURE — 99214 OFFICE O/P EST MOD 30 MIN: CPT | Performed by: INTERNAL MEDICINE

## 2024-01-25 PROCEDURE — 3075F SYST BP GE 130 - 139MM HG: CPT | Performed by: INTERNAL MEDICINE

## 2024-01-25 RX ORDER — ATORVASTATIN CALCIUM 20 MG/1
20 TABLET, FILM COATED ORAL DAILY
Qty: 90 TABLET | Refills: 2 | Status: SHIPPED | OUTPATIENT
Start: 2024-01-25 | End: 2024-04-30 | Stop reason: SDUPTHER

## 2024-01-25 RX ORDER — SEMAGLUTIDE 1.34 MG/ML
1 INJECTION, SOLUTION SUBCUTANEOUS
Qty: 6 ML | Refills: 2 | Status: SHIPPED | OUTPATIENT
Start: 2024-01-25 | End: 2024-01-26

## 2024-01-25 NOTE — PROGRESS NOTES
Adeola Moody is a 63 y.o. female   Patient with a past medical history of HTN, HLD, DM, RA, Sjogren, Depression, DJD, Colonoscopy in 2030, Mammogram (June)    C/o lower back pain  Radiates down right buttock  Happens off and on    Feels fine  No chest pain/  SOB/ dizziness  BM OK  Energy level ok  Appetite OK      No polyuria  No polydipsia  Nocturia            Review of Systems     Constitutional: no fever, no chills, not feeling poorly, not feeling tired and no recent weight gain, no recent weight loss.   ENT: no earache, no hearing loss, no nosebleeds, no nasal discharge, no sore throat and no hoarseness.   Cardiovascular: the heart rate was not slow, the heart rate was not fast, no chest pain, no palpitations, no intermittent leg claudication and no lower extremity edema.   Respiratory: no cough, wheezing or shortness of breath at rest or exertion  Gastrointestinal: no abdominal pain, no constipation, no melena, no nausea, no diarrhea, no vomiting and no blood in stools.   Musculoskeletal: no arthralgias, no myalgias, no back pain, no joint swelling, no joint stiffness, no limb pain and no limb swelling.   Integumentary: no skin rashes, no skin lesions, no itching, no skin wound and no dry skin.   Neurological: no headache, no confusion, no numbness, no dizziness, no tingling and no fainting.   All other systems have been reviewed and are negative for complaint.       There were no vitals filed for this visit.       Physical Exam     Constitutional   General appearance: Alert and in no acute distress.     Pulmonary   Respiratory assessment: No respiratory distress, normal respiratory rhythm and effort.    Auscultation of Lungs: Clear bilateral breath sounds.   Cardiovascular   Auscultation of heart: Apical pulse normal, heart rate and rhythm normal, normal S1 and S2, no murmurs and no pericardial rub.    Exam for edema: No peripheral edema.   Abdomen   Abdominal Exam: No bruits, normal bowel sounds,  soft, non-tender, no abdominal mass palpated.    Liver and Spleen exam: No hepato-splenomegaly.   Musculoskeletal   Examination of gait: Normal.    Inspection of digits and nails: No clubbing or cyanosis of the fingernails.    Inspection/palpation of joints, bones and muscles: No joint swelling. Normal movement of all extremities.   Skin   Skin inspection: Normal skin color and pigmentation, normal skin turgor and no visible rash.   Neurologic   Cranial nerves: Nerves 2-12 were intact, no focal neuro defects.     Assessment/Plan          Patient with a past medical history of HTN, HLD, DM, RA, Sjogren, Depression, DJD, Colonoscopy in 2030, Mammogram (June)        # DM/ Morbid Obesity  A1c of 5.9%  Continue Ozempic  Has lost a little weight          # HTN  stable        # HLD  Cont Rx  Watch salt, avoid excessive caffeine  Avoid processed meats/ sugars/juices Instead eat fresh fruit  Add walnuts and almonds to your diet  exercise 6 days a week for 30 minutes     # Abnormal mammogram  Breast USG    # Rheumatoid Arthritis  Stable    # Urinary Incontinence  Will see UroGYN

## 2024-01-26 ENCOUNTER — TELEPHONE (OUTPATIENT)
Dept: PRIMARY CARE | Facility: CLINIC | Age: 64
End: 2024-01-26

## 2024-01-26 DIAGNOSIS — E11.65 TYPE 2 DIABETES MELLITUS WITH HYPERGLYCEMIA, WITH LONG-TERM CURRENT USE OF INSULIN (MULTI): ICD-10-CM

## 2024-01-26 DIAGNOSIS — Z79.4 TYPE 2 DIABETES MELLITUS WITH HYPERGLYCEMIA, WITH LONG-TERM CURRENT USE OF INSULIN (MULTI): ICD-10-CM

## 2024-01-26 RX ORDER — SEMAGLUTIDE 1.34 MG/ML
1 INJECTION, SOLUTION SUBCUTANEOUS
Qty: 3 ML | Refills: 0 | Status: SHIPPED | OUTPATIENT
Start: 2024-01-26 | End: 2024-02-26

## 2024-01-26 NOTE — TELEPHONE ENCOUNTER
PATIENT CALL STATED SHE NEEDS A REFERRAL TO SEE THE OPHTHALMOLOGIST SHE HAVE A FLOATERS IN HER EYES

## 2024-02-25 DIAGNOSIS — E11.65 TYPE 2 DIABETES MELLITUS WITH HYPERGLYCEMIA, WITH LONG-TERM CURRENT USE OF INSULIN (MULTI): ICD-10-CM

## 2024-02-25 DIAGNOSIS — Z79.4 TYPE 2 DIABETES MELLITUS WITH HYPERGLYCEMIA, WITH LONG-TERM CURRENT USE OF INSULIN (MULTI): ICD-10-CM

## 2024-02-26 RX ORDER — SEMAGLUTIDE 1.34 MG/ML
INJECTION, SOLUTION SUBCUTANEOUS
Qty: 3 ML | Refills: 1 | Status: SHIPPED | OUTPATIENT
Start: 2024-02-26 | End: 2024-03-11

## 2024-03-09 DIAGNOSIS — E11.65 TYPE 2 DIABETES MELLITUS WITH HYPERGLYCEMIA, WITH LONG-TERM CURRENT USE OF INSULIN (MULTI): ICD-10-CM

## 2024-03-09 DIAGNOSIS — Z79.4 TYPE 2 DIABETES MELLITUS WITH HYPERGLYCEMIA, WITH LONG-TERM CURRENT USE OF INSULIN (MULTI): ICD-10-CM

## 2024-03-11 RX ORDER — SEMAGLUTIDE 1.34 MG/ML
1 INJECTION, SOLUTION SUBCUTANEOUS
Qty: 3 ML | Refills: 0 | Status: SHIPPED | OUTPATIENT
Start: 2024-03-11 | End: 2024-04-08

## 2024-03-27 DIAGNOSIS — I10 PRIMARY HYPERTENSION: ICD-10-CM

## 2024-03-29 RX ORDER — AMLODIPINE AND BENAZEPRIL HYDROCHLORIDE 10; 40 MG/1; MG/1
1 CAPSULE ORAL DAILY
Qty: 30 CAPSULE | Refills: 0 | Status: SHIPPED | OUTPATIENT
Start: 2024-03-29 | End: 2024-04-30 | Stop reason: SDUPTHER

## 2024-03-29 NOTE — TELEPHONE ENCOUNTER
Rx Refill Request Telephone Encounter    Name:  Adeola Moody  :  [id number]  Specific Pharmacy location:  [facility]

## 2024-04-07 DIAGNOSIS — E11.65 TYPE 2 DIABETES MELLITUS WITH HYPERGLYCEMIA, WITH LONG-TERM CURRENT USE OF INSULIN (MULTI): ICD-10-CM

## 2024-04-07 DIAGNOSIS — Z79.4 TYPE 2 DIABETES MELLITUS WITH HYPERGLYCEMIA, WITH LONG-TERM CURRENT USE OF INSULIN (MULTI): ICD-10-CM

## 2024-04-08 RX ORDER — SEMAGLUTIDE 1.34 MG/ML
1 INJECTION, SOLUTION SUBCUTANEOUS
Qty: 3 ML | Refills: 3 | Status: SHIPPED | OUTPATIENT
Start: 2024-04-14 | End: 2024-04-30 | Stop reason: SDUPTHER

## 2024-04-30 ENCOUNTER — OFFICE VISIT (OUTPATIENT)
Dept: PRIMARY CARE | Facility: CLINIC | Age: 64
End: 2024-04-30
Payer: COMMERCIAL

## 2024-04-30 VITALS
HEART RATE: 70 BPM | BODY MASS INDEX: 36.21 KG/M2 | WEIGHT: 185.4 LBS | SYSTOLIC BLOOD PRESSURE: 130 MMHG | TEMPERATURE: 98.9 F | RESPIRATION RATE: 16 BRPM | DIASTOLIC BLOOD PRESSURE: 80 MMHG

## 2024-04-30 DIAGNOSIS — H10.13 ALLERGIC CONJUNCTIVITIS OF BOTH EYES: ICD-10-CM

## 2024-04-30 DIAGNOSIS — E13.69 OTHER SPECIFIED DIABETES MELLITUS WITH OTHER SPECIFIED COMPLICATION, UNSPECIFIED WHETHER LONG TERM INSULIN USE (MULTI): ICD-10-CM

## 2024-04-30 DIAGNOSIS — E78.49 OTHER HYPERLIPIDEMIA: ICD-10-CM

## 2024-04-30 DIAGNOSIS — E11.65 TYPE 2 DIABETES MELLITUS WITH HYPERGLYCEMIA, WITH LONG-TERM CURRENT USE OF INSULIN (MULTI): ICD-10-CM

## 2024-04-30 DIAGNOSIS — Z79.4 TYPE 2 DIABETES MELLITUS WITH HYPERGLYCEMIA, WITH LONG-TERM CURRENT USE OF INSULIN (MULTI): ICD-10-CM

## 2024-04-30 DIAGNOSIS — J32.9 CHRONIC SINUSITIS, UNSPECIFIED LOCATION: ICD-10-CM

## 2024-04-30 DIAGNOSIS — I10 PRIMARY HYPERTENSION: Primary | ICD-10-CM

## 2024-04-30 PROBLEM — K64.9 HEMORRHOIDS: Status: ACTIVE | Noted: 2024-04-30

## 2024-04-30 PROBLEM — K63.5 BENIGN COLONIC POLYP: Status: ACTIVE | Noted: 2024-04-30

## 2024-04-30 PROBLEM — Z86.0100 HISTORY OF COLONIC POLYPS: Status: ACTIVE | Noted: 2024-04-30

## 2024-04-30 PROBLEM — E66.9 OBESITY WITH BODY MASS INDEX 30 OR GREATER: Status: ACTIVE | Noted: 2024-04-30

## 2024-04-30 PROBLEM — K57.30 DIVERTICULOSIS OF LARGE INTESTINE: Status: ACTIVE | Noted: 2024-04-30

## 2024-04-30 PROBLEM — Z86.010 HISTORY OF COLONIC POLYPS: Status: ACTIVE | Noted: 2024-04-30

## 2024-04-30 PROBLEM — Z79.85 LONG-TERM (CURRENT) USE OF INJECTABLE NON-INSULIN ANTIDIABETIC DRUGS: Status: ACTIVE | Noted: 2024-04-30

## 2024-04-30 PROBLEM — J45.909 UNCOMPLICATED ASTHMA (HHS-HCC): Status: ACTIVE | Noted: 2024-04-30

## 2024-04-30 LAB
POC FINGERSTICK BLOOD GLUCOSE: 89 MG/DL (ref 70–100)
POC HEMOGLOBIN A1C: 6.1 % (ref 4.2–6.5)

## 2024-04-30 PROCEDURE — 3079F DIAST BP 80-89 MM HG: CPT | Performed by: INTERNAL MEDICINE

## 2024-04-30 PROCEDURE — 3075F SYST BP GE 130 - 139MM HG: CPT | Performed by: INTERNAL MEDICINE

## 2024-04-30 PROCEDURE — 3008F BODY MASS INDEX DOCD: CPT | Performed by: INTERNAL MEDICINE

## 2024-04-30 PROCEDURE — 82962 GLUCOSE BLOOD TEST: CPT | Performed by: INTERNAL MEDICINE

## 2024-04-30 PROCEDURE — 1036F TOBACCO NON-USER: CPT | Performed by: INTERNAL MEDICINE

## 2024-04-30 PROCEDURE — 99214 OFFICE O/P EST MOD 30 MIN: CPT | Performed by: INTERNAL MEDICINE

## 2024-04-30 PROCEDURE — 83036 HEMOGLOBIN GLYCOSYLATED A1C: CPT | Performed by: INTERNAL MEDICINE

## 2024-04-30 RX ORDER — OLOPATADINE HYDROCHLORIDE 1 MG/ML
1 SOLUTION/ DROPS OPHTHALMIC 2 TIMES DAILY PRN
Qty: 5 ML | Refills: 1 | Status: SHIPPED | OUTPATIENT
Start: 2024-04-30 | End: 2024-08-28

## 2024-04-30 RX ORDER — SEMAGLUTIDE 1.34 MG/ML
1 INJECTION, SOLUTION SUBCUTANEOUS
Qty: 3 ML | Refills: 3 | Status: SHIPPED | OUTPATIENT
Start: 2024-04-30

## 2024-04-30 RX ORDER — AMLODIPINE AND BENAZEPRIL HYDROCHLORIDE 10; 40 MG/1; MG/1
1 CAPSULE ORAL DAILY
Qty: 30 CAPSULE | Refills: 3 | Status: SHIPPED | OUTPATIENT
Start: 2024-04-30 | End: 2024-08-28

## 2024-04-30 RX ORDER — ATORVASTATIN CALCIUM 20 MG/1
20 TABLET, FILM COATED ORAL DAILY
Qty: 90 TABLET | Refills: 2 | Status: SHIPPED | OUTPATIENT
Start: 2024-04-30 | End: 2025-01-25

## 2024-04-30 RX ORDER — MOMETASONE FUROATE 50 UG/1
2 SPRAY, METERED NASAL DAILY
Qty: 17 G | Refills: 2 | Status: SHIPPED | OUTPATIENT
Start: 2024-04-30 | End: 2024-06-29

## 2024-04-30 NOTE — PROGRESS NOTES
Adeola Moody is a 63 y.o. female   Patient with a past medical history of HTN, HLD, DM, RA, Sjogren, Depression, DJD, Colonoscopy in 2030, Mammogram (Nov)    Injured her left shoulder at work  Is going through C  Will be seeing PT      No chest pain/  SOB/ dizziness  BM OK  Energy level ok  Appetite OK      No polyuria  No polydipsia  Nocturia            Review of Systems     Constitutional: no fever, no chills, not feeling poorly, not feeling tired and no recent weight gain, no recent weight loss.   ENT: no earache, no hearing loss, no nosebleeds, no nasal discharge, no sore throat and no hoarseness.   Cardiovascular: the heart rate was not slow, the heart rate was not fast, no chest pain, no palpitations, no intermittent leg claudication and no lower extremity edema.   Respiratory: no cough, wheezing or shortness of breath at rest or exertion  Gastrointestinal: no abdominal pain, no constipation, no melena, no nausea, no diarrhea, no vomiting and no blood in stools.   Musculoskeletal: no arthralgias, no myalgias, no back pain, no joint swelling, no joint stiffness, no limb pain and no limb swelling.   Integumentary: no skin rashes, no skin lesions, no itching, no skin wound and no dry skin.   Neurological: no headache, no confusion, no numbness, no dizziness, no tingling and no fainting.   All other systems have been reviewed and are negative for complaint.       There were no vitals filed for this visit.       Physical Exam     Constitutional   General appearance: Alert and in no acute distress.     Pulmonary   Respiratory assessment: No respiratory distress, normal respiratory rhythm and effort.    Auscultation of Lungs: Clear bilateral breath sounds.   Cardiovascular   Auscultation of heart: Apical pulse normal, heart rate and rhythm normal, normal S1 and S2, no murmurs and no pericardial rub.    Exam for edema: No peripheral edema.   Abdomen   Abdominal Exam: No bruits, normal bowel sounds, soft,  non-tender, no abdominal mass palpated.    Liver and Spleen exam: No hepato-splenomegaly.   Musculoskeletal   Examination of gait: Normal.    Inspection of digits and nails: No clubbing or cyanosis of the fingernails.    Inspection/palpation of joints, bones and muscles: No joint swelling. Normal movement of all extremities.   Skin   Skin inspection: Normal skin color and pigmentation, normal skin turgor and no visible rash.   Neurologic   Cranial nerves: Nerves 2-12 were intact, no focal neuro defects.     Assessment/Plan          Patient with a past medical history of HTN, HLD, DM, RA, Sjogren, Depression, DJD, Colonoscopy in 2030, Mammogram (Nov)        # DM/ Morbid Obesity  A1c is 6.1  Has put on weight on Ozempic  Eating out a lot          # HTN  Stable  Continue current medications          # HLD  Cont Rx  Watch salt, avoid excessive caffeine  Avoid processed meats/ sugars/juices Instead eat fresh fruit  Add walnuts and almonds to your diet  exercise 6 days a week for 30 minutes        # Rheumatoid Arthritis  Stable    # Urinary Incontinence  Not any worse  Did not go see UroGYN    # Seasonal allergies  # Allergic Conjunctivitis  Nasonex  Patanol

## 2024-06-22 DIAGNOSIS — I10 PRIMARY HYPERTENSION: ICD-10-CM

## 2024-06-24 RX ORDER — AMLODIPINE AND BENAZEPRIL HYDROCHLORIDE 10; 40 MG/1; MG/1
1 CAPSULE ORAL DAILY
Qty: 90 CAPSULE | Refills: 1 | Status: SHIPPED | OUTPATIENT
Start: 2024-06-24

## 2024-06-26 DIAGNOSIS — E78.49 OTHER HYPERLIPIDEMIA: ICD-10-CM

## 2024-06-27 RX ORDER — ATORVASTATIN CALCIUM 20 MG/1
20 TABLET, FILM COATED ORAL DAILY
Qty: 90 TABLET | Refills: 2 | Status: SHIPPED | OUTPATIENT
Start: 2024-06-27 | End: 2025-03-24

## 2024-07-30 ENCOUNTER — APPOINTMENT (OUTPATIENT)
Dept: PRIMARY CARE | Facility: CLINIC | Age: 64
End: 2024-07-30
Payer: COMMERCIAL

## 2024-09-12 ENCOUNTER — APPOINTMENT (OUTPATIENT)
Dept: PRIMARY CARE | Facility: CLINIC | Age: 64
End: 2024-09-12
Payer: COMMERCIAL

## 2024-09-12 VITALS
DIASTOLIC BLOOD PRESSURE: 70 MMHG | RESPIRATION RATE: 16 BRPM | SYSTOLIC BLOOD PRESSURE: 120 MMHG | HEART RATE: 68 BPM | TEMPERATURE: 98.4 F | BODY MASS INDEX: 40.64 KG/M2 | WEIGHT: 207 LBS | HEIGHT: 60 IN

## 2024-09-12 DIAGNOSIS — E13.69 OTHER SPECIFIED DIABETES MELLITUS WITH OTHER SPECIFIED COMPLICATION, UNSPECIFIED WHETHER LONG TERM INSULIN USE (MULTI): ICD-10-CM

## 2024-09-12 DIAGNOSIS — Z12.31 SCREENING MAMMOGRAM, ENCOUNTER FOR: ICD-10-CM

## 2024-09-12 DIAGNOSIS — E78.49 OTHER HYPERLIPIDEMIA: ICD-10-CM

## 2024-09-12 DIAGNOSIS — H43.392 VITREOUS FLOATERS OF LEFT EYE: ICD-10-CM

## 2024-09-12 DIAGNOSIS — E66.01 MORBID OBESITY (MULTI): ICD-10-CM

## 2024-09-12 DIAGNOSIS — I10 PRIMARY HYPERTENSION: Primary | ICD-10-CM

## 2024-09-12 DIAGNOSIS — Z00.00 ANNUAL PHYSICAL EXAM: ICD-10-CM

## 2024-09-12 LAB — POC FINGERSTICK BLOOD GLUCOSE: 79 MG/DL (ref 70–100)

## 2024-09-12 NOTE — PROGRESS NOTES
Adeola Moody is a 64 y.o. female   Patient with a past medical history of HTN, HLD, DM, RA, Sjogren, Depression, DJD, Colonoscopy in 2030, Mammogram (Nov)    Feels thirsty a lot  Drinks a lot water  Goes to the bathroom a lot      No chest pain/  SOB/ dizziness  BM OK  Energy level ok  Appetite OK             Review of Systems     Constitutional: not feeling poorly, no fever, no recent weight gain and no recent weight loss.   Eyes: no blurred vision and no diplopia.   ENT: no hearing loss, no tinnitus, no earache, no sore throat, no hoarseness and no swollen glands in the neck.   Cardiovascular: no chest pain, no tightness or heavy pressure, no shortness of breath, no palpitations and no lower extremity edema.   Respiratory: no cough, wheezing or shortness of breath at rest or exertion  Gastrointestinal: no change in bowel habits, no diarrhea, no constipation, no bloody stools, no nausea, no vomiting, no abdominal pain, no signs and symptoms of ulcer disease, no cali colored stools and no intolerance to fatty foods.   Genitourinary: no dysuria, no hematuria, no burning sensation during urination, urinary stream is not smaller and urinary stream does not start and stop.   Musculoskeletal: no arthralgias,  no muscle weakness, no back pain and no difficulty walking.   Skin: no rashes, no change in skin color and pigmentation, no skin lesions and no skin lumps.   Neurological: no headaches, no dizziness, no seizures, no tingling, no numbness, no signs and symptoms of stroke and no limb weakness.   Psychiatric: no confusion, no memory lapses or loss, no depression and no sleep disturbances.   Endocrine: ,  no dry skin, no cold intolerance, no heat intolerance a  Hematologic/Lymphatic: is not slow to heal, does not bleed easily, does not bruise easily, no thrombophlebitis, no anemia and no history of blood transfusion.   All other systems have been reviewed and are negative for complaint.     Current Outpatient  Medications   Medication Instructions    amLODIPine-benazepriL (Lotrel) 10-40 mg capsule 1 capsule, oral, Daily    amoxicillin (Amoxil) 875 mg tablet 1 tablet, oral, 2 times daily    atorvastatin (LIPITOR) 20 mg, oral, Daily    diclofenac sodium 1.5 % drops transdermal    estradiol (Estrace) 0.01 % (0.1 mg/gram) vaginal cream vaginal    fluticasone (Flonase) 50 mcg/actuation nasal spray 1 spray, nasal, 2 times daily    FreeStyle Lite Strips strip 2 times daily    furosemide (Lasix) 20 mg tablet     gabapentin (Neurontin) 100 mg capsule     methocarbamol (ROBAXIN) 750 mg, oral, 4 times daily    mirabegron (Mybetriq) 25 mg tablet extended release 24 hr 24 hr tablet oral    mometasone (Nasonex) 50 mcg/actuation nasal spray 2 sprays, Each Nostril, Daily    naproxen (Naprosyn) 500 mg tablet 1 tablet, oral, 2 times daily (morning and late afternoon)    olopatadine (Patanol) 0.1 % ophthalmic solution 1 drop, Both Eyes, 2 times daily PRN    oxybutynin (Ditropan) 5 mg tablet 1 tablet, oral, Daily    Ozempic 1 mg, subcutaneous, Once Weekly    pilocarpine (Salagen) 5 mg tablet 1 tablet, oral, 2 times daily       Vitals:    09/12/24 1542   BP: 120/70   Pulse: 68   Resp: 16   Temp: 36.9 °C (98.4 °F)        Physical Exam     Constitutional   General appearance: Alert and in no acute distress.   Eyes   Inspection of eyes: Sclera and conjunctiva were normal.    Pupil exam: Pupils were equal in size. Extraocular movements were intact.   Ears, Nose, Mouth, and Throat   Ears: Auricles: Normal.    Otoscopic examination: Tympanic membranes: Normal with no congestion and no discharge. Otic Canals: Normal without tenderness, congestion or discharge.    Oropharynx: Normal with moist mucus membranes, no congestion. Tonsils: Normal no follicles.    Hearing: Normal.     Nasal mucosa, septum, and turbinates: Normal without edema or erythema.    Lips, teeth, and gums: Normal.   Neck   Neck Exam: Appearance of the neck was normal. No neck masses  observed.    Thyroid exam: Not enlarged and no palpable thyroid nodules.   Pulmonary   Respiratory assessment: No respiratory distress, normal respiratory rhythm and effort.    Auscultation of Lungs: Clear bilateral breath sounds.   Cardiovascular   Auscultation of heart: Apical pulse normal, heart rate and rhythm normal, normal S1 and S2, no murmurs and no pericardial rub.    Carotid arteries: Pulses normal with no bruits.    Femoral pulses: Normal without bruits.    Exam for edema: No peripheral edema.    Abdominal aorta: Normal.     Pedal pulses: 2+ bilaterally.   Chest   Breast inspection: NOT EXAMINED  Chest: Normal A_P diameter, no pulsation, no intercostal withdrawing. Trachea central.   Abdomen   Abdominal Exam: No bruits, normal bowel sounds, soft, non-tender, no abdominal mass palpated.    Liver and Spleen exam: No hepato-splenomegaly.    Rectal exam: Deferred  Genitourinary   DEFER TO GYNECOLOGY    Lymphatic   Palpation of lymph nodes in neck: No cervical lymphadenopathy.   Musculoskeletal   Examination of gait: Normal.    Inspection of digits and nails: No clubbing or cyanosis of the fingernails.    Inspection/palpation of joints, bones and muscles: No joint swelling. Normal movement of all extremities.    Range of Motion: Normal movement of all extremities.    Assessment of Stability: Normal.    Muscle strength/tone: Normal.    Skin   Skin inspection: Normal skin color and pigmentation, normal skin turgor and no visible rash.    Examination of the skin for lesions: Normal.    Neurologic   Cranial nerves: Nerves 2-12 were intact, no focal neuro defects.    Cortical function: Normal.     Reflexes: Normal.     Sensation: Normal.     Coordination: Normal.    Psychiatric    Judgment and insight: Intact.    Orientation: Oriented to person, place, and time.    Mood and affect: Normal.    Recent and remote memory: Normal.       Right Foot Findings: warmth, but normal visual exam, no swelling, no erythema, no  dryness, no tenderness and no maceration. The toes were normal, were not swollen, were not erythematous and had full range of motion. The sensory exam showed normal vibratory sensation at the level of the toes and diminished position sense at the level of the toes.       Left Foot Findings: warmth, but normal visual exam, no swelling, no erythema, no dryness, no tenderness and no maceration. The toes were normal, were not swollen, were not erythematous, had full range of motion and were non-tender. The sensory exam showed normal vibratory sensation at the level of the toes and diminished position sense at the level of the toes.         Assessment/Plan      Problem List Items Addressed This Visit       Other specified diabetes mellitus with other specified complication, unspecified whether long term insulin use (Multi)    Relevant Orders    POCT fingerstick glucose manually resulted (Completed)    POCT glycosylated hemoglobin (Hb A1C) manually resulted    Hyperlipidemia    Hypertension - Primary    Relevant Orders    ECG 12 Lead (Completed)    Morbid obesity (Multi)        Patient with a past medical history of HTN, HLD, DM, RA, Sjogren, Depression, DJD, Colonoscopy in 2030, Mammogram (Nov)        # DM/ Morbid Obesity  Stable  Continue current medications          # HTN  Stable  Continue current medications           # HLD  Cont Rx  Watch salt, avoid excessive caffeine  Avoid processed meats/ sugars/juices Instead eat fresh fruit  Add walnuts and almonds to your diet  exercise 6 days a week for 30 minutes           # Rheumatoid Arthritis  Stable     Blood work  CT calcium score  Mammogram,  Opthalmology for floaters

## 2024-10-14 ENCOUNTER — TELEMEDICINE (OUTPATIENT)
Dept: PRIMARY CARE | Facility: CLINIC | Age: 64
End: 2024-10-14
Payer: COMMERCIAL

## 2024-10-14 DIAGNOSIS — J20.9 ACUTE BRONCHITIS, UNSPECIFIED ORGANISM: Primary | ICD-10-CM

## 2024-10-14 PROCEDURE — 99213 OFFICE O/P EST LOW 20 MIN: CPT | Performed by: INTERNAL MEDICINE

## 2024-10-14 RX ORDER — AZITHROMYCIN 250 MG/1
TABLET, FILM COATED ORAL
Qty: 6 TABLET | Refills: 0 | Status: SHIPPED | OUTPATIENT
Start: 2024-10-14 | End: 2024-10-19

## 2024-11-06 ENCOUNTER — APPOINTMENT (OUTPATIENT)
Dept: OPHTHALMOLOGY | Facility: CLINIC | Age: 64
End: 2024-11-06
Payer: COMMERCIAL

## 2024-11-06 DIAGNOSIS — H43.392 VITREOUS FLOATERS OF LEFT EYE: Primary | ICD-10-CM

## 2024-11-06 DIAGNOSIS — M35.00 SJOGREN'S SYNDROME, WITH UNSPECIFIED ORGAN INVOLVEMENT (MULTI): ICD-10-CM

## 2024-11-06 PROCEDURE — 99203 OFFICE O/P NEW LOW 30 MIN: CPT | Performed by: OPHTHALMOLOGY

## 2024-11-06 PROCEDURE — 92134 CPTRZ OPH DX IMG PST SGM RTA: CPT | Performed by: OPHTHALMOLOGY

## 2024-11-06 RX ORDER — LEFLUNOMIDE 10 MG/1
TABLET ORAL
COMMUNITY
Start: 2016-07-28

## 2024-11-06 RX ORDER — DIAZEPAM 5 MG/1
TABLET ORAL
COMMUNITY
Start: 2019-03-19

## 2024-11-06 RX ORDER — ZOLPIDEM TARTRATE 5 MG/1
TABLET ORAL
COMMUNITY
Start: 2018-09-27

## 2024-11-06 RX ORDER — PAROXETINE 10 MG/1
TABLET, FILM COATED ORAL
COMMUNITY
Start: 2022-01-31

## 2024-11-06 RX ORDER — BENZONATATE 100 MG/1
CAPSULE ORAL
COMMUNITY
Start: 2024-09-27

## 2024-11-06 RX ORDER — METFORMIN HYDROCHLORIDE 500 MG/1
1 TABLET ORAL 2 TIMES DAILY
COMMUNITY
Start: 2022-02-25

## 2024-11-06 RX ORDER — IBUPROFEN 600 MG/1
600 TABLET ORAL EVERY 6 HOURS PRN
COMMUNITY
Start: 2024-03-07

## 2024-11-06 RX ORDER — HYDROXYCHLOROQUINE SULFATE 200 MG/1
TABLET, FILM COATED ORAL EVERY 12 HOURS
COMMUNITY
Start: 2016-03-18

## 2024-11-06 RX ORDER — CYCLOBENZAPRINE HCL 10 MG
10 TABLET ORAL EVERY 8 HOURS PRN
COMMUNITY
Start: 2024-03-07

## 2024-11-06 RX ORDER — ATORVASTATIN CALCIUM 10 MG/1
1 TABLET, FILM COATED ORAL DAILY
COMMUNITY
Start: 2018-03-13

## 2024-11-06 RX ORDER — DEXTROMETHORPHAN HYDROBROMIDE, GUAIFENESIN 5; 100 MG/5ML; MG/5ML
1 LIQUID ORAL
COMMUNITY
Start: 2024-03-18

## 2024-11-06 RX ORDER — CYCLOSPORINE 0.5 MG/ML
1 EMULSION OPHTHALMIC 2 TIMES DAILY
Qty: 60 ML | Refills: 11 | Status: SHIPPED | OUTPATIENT
Start: 2024-11-06

## 2024-11-06 RX ORDER — TIRZEPATIDE 2.5 MG/.5ML
INJECTION, SOLUTION SUBCUTANEOUS
COMMUNITY
Start: 2022-11-22

## 2024-11-06 RX ORDER — PROGESTERONE 100 MG/1
CAPSULE ORAL
COMMUNITY
Start: 2020-01-10

## 2024-11-06 RX ORDER — ALBUTEROL SULFATE 90 UG/1
INHALANT RESPIRATORY (INHALATION)
COMMUNITY
Start: 2024-09-27

## 2024-11-06 ASSESSMENT — CONF VISUAL FIELD
OD_INFERIOR_NASAL_RESTRICTION: 0
OD_NORMAL: 1
OD_SUPERIOR_NASAL_RESTRICTION: 0
OS_INFERIOR_TEMPORAL_RESTRICTION: 0
OS_NORMAL: 1
OD_INFERIOR_TEMPORAL_RESTRICTION: 0
OD_SUPERIOR_TEMPORAL_RESTRICTION: 0
OS_SUPERIOR_NASAL_RESTRICTION: 0
OS_INFERIOR_NASAL_RESTRICTION: 0
OS_SUPERIOR_TEMPORAL_RESTRICTION: 0

## 2024-11-06 ASSESSMENT — CUP TO DISC RATIO
OS_RATIO: 0.5
OD_RATIO: 0.5

## 2024-11-06 ASSESSMENT — ENCOUNTER SYMPTOMS
CARDIOVASCULAR NEGATIVE: 0
GASTROINTESTINAL NEGATIVE: 0
NEUROLOGICAL NEGATIVE: 0
HEMATOLOGIC/LYMPHATIC NEGATIVE: 0
PSYCHIATRIC NEGATIVE: 0
EYES NEGATIVE: 1
MUSCULOSKELETAL NEGATIVE: 0
ENDOCRINE NEGATIVE: 0
CONSTITUTIONAL NEGATIVE: 0
ALLERGIC/IMMUNOLOGIC NEGATIVE: 0
RESPIRATORY NEGATIVE: 0

## 2024-11-06 ASSESSMENT — TONOMETRY
IOP_METHOD: TONOPEN
OD_IOP_MMHG: 22
OS_IOP_MMHG: 19

## 2024-11-06 ASSESSMENT — VISUAL ACUITY
OD_SC+: -1
METHOD: SNELLEN - LINEAR
OS_SC+: +2
OD_SC: 20/20
OS_SC: 20/25

## 2024-11-06 ASSESSMENT — SLIT LAMP EXAM - LIDS
COMMENTS: GOOD POSITION, MGD 2+ UL AND LL
COMMENTS: GOOD POSITION, MGD 2+ UL AND LL

## 2024-11-06 ASSESSMENT — EXTERNAL EXAM - RIGHT EYE: OD_EXAM: NORMAL

## 2024-11-06 ASSESSMENT — EXTERNAL EXAM - LEFT EYE: OS_EXAM: NORMAL

## 2024-11-07 NOTE — PROGRESS NOTES
Assessment/Plan   Diagnoses and all orders for this visit:  Vitreous floaters of left eye  No breaks on exam  Sjogren's syndrome, with unspecified organ involvement (Multi)  Has +1 punctate epithelial erosions (PEE). Has been on Restasis before whoch helped with her dryness and shoe would like to use it again.    Plan:  -     cycloSPORINE (Restasis) 0.05 % ophthalmic emulsion; Administer 1 drop into both eyes 2 times a day.  -     dextran 70-hypromellose, PF, (Bion Tears) 0.1-0.3 % ophthalmic solution; Administer 1 drop into both eyes 4 times a day.  See in 3 months.

## 2024-11-23 DIAGNOSIS — E11.65 TYPE 2 DIABETES MELLITUS WITH HYPERGLYCEMIA, WITH LONG-TERM CURRENT USE OF INSULIN: ICD-10-CM

## 2024-11-23 DIAGNOSIS — Z79.4 TYPE 2 DIABETES MELLITUS WITH HYPERGLYCEMIA, WITH LONG-TERM CURRENT USE OF INSULIN: ICD-10-CM

## 2024-11-26 RX ORDER — SEMAGLUTIDE 1.34 MG/ML
1 INJECTION, SOLUTION SUBCUTANEOUS
Qty: 3 ML | Refills: 0 | Status: SHIPPED | OUTPATIENT
Start: 2024-12-01

## 2024-11-27 ENCOUNTER — APPOINTMENT (OUTPATIENT)
Dept: OPHTHALMOLOGY | Facility: CLINIC | Age: 64
End: 2024-11-27
Payer: COMMERCIAL

## 2024-11-27 ENCOUNTER — HOSPITAL ENCOUNTER (OUTPATIENT)
Dept: RADIOLOGY | Facility: CLINIC | Age: 64
Discharge: HOME | End: 2024-11-27
Payer: COMMERCIAL

## 2024-11-27 VITALS — WEIGHT: 200 LBS | HEIGHT: 64 IN | BODY MASS INDEX: 34.15 KG/M2

## 2024-11-27 DIAGNOSIS — Z12.31 SCREENING MAMMOGRAM, ENCOUNTER FOR: ICD-10-CM

## 2024-11-27 PROCEDURE — 77063 BREAST TOMOSYNTHESIS BI: CPT

## 2024-12-03 DIAGNOSIS — R92.8 ABNORMAL MAMMOGRAM OF RIGHT BREAST: Primary | ICD-10-CM

## 2024-12-11 ENCOUNTER — HOSPITAL ENCOUNTER (OUTPATIENT)
Dept: RADIOLOGY | Facility: CLINIC | Age: 64
Discharge: HOME | End: 2024-12-11
Payer: COMMERCIAL

## 2024-12-11 VITALS — BODY MASS INDEX: 34.14 KG/M2 | WEIGHT: 199.96 LBS | HEIGHT: 64 IN

## 2024-12-11 DIAGNOSIS — R92.8 ABNORMAL MAMMOGRAM OF RIGHT BREAST: ICD-10-CM

## 2024-12-11 PROCEDURE — 77061 BREAST TOMOSYNTHESIS UNI: CPT | Mod: RIGHT SIDE | Performed by: RADIOLOGY

## 2024-12-11 PROCEDURE — 77065 DX MAMMO INCL CAD UNI: CPT | Mod: RIGHT SIDE | Performed by: RADIOLOGY

## 2024-12-11 PROCEDURE — 77065 DX MAMMO INCL CAD UNI: CPT | Mod: RT

## 2024-12-11 PROCEDURE — 76981 USE PARENCHYMA: CPT | Mod: RT

## 2024-12-11 PROCEDURE — 76642 ULTRASOUND BREAST LIMITED: CPT | Mod: RIGHT SIDE | Performed by: RADIOLOGY

## 2024-12-11 PROCEDURE — 76642 ULTRASOUND BREAST LIMITED: CPT | Mod: RT

## 2024-12-14 DIAGNOSIS — I10 PRIMARY HYPERTENSION: ICD-10-CM

## 2024-12-16 ENCOUNTER — APPOINTMENT (OUTPATIENT)
Dept: PRIMARY CARE | Facility: CLINIC | Age: 64
End: 2024-12-16
Payer: COMMERCIAL

## 2024-12-16 VITALS
BODY MASS INDEX: 33.83 KG/M2 | SYSTOLIC BLOOD PRESSURE: 120 MMHG | RESPIRATION RATE: 16 BRPM | HEART RATE: 68 BPM | TEMPERATURE: 98.3 F | DIASTOLIC BLOOD PRESSURE: 80 MMHG | WEIGHT: 197.2 LBS

## 2024-12-16 DIAGNOSIS — E78.49 OTHER HYPERLIPIDEMIA: ICD-10-CM

## 2024-12-16 DIAGNOSIS — E13.69 OTHER SPECIFIED DIABETES MELLITUS WITH OTHER SPECIFIED COMPLICATION, UNSPECIFIED WHETHER LONG TERM INSULIN USE (MULTI): ICD-10-CM

## 2024-12-16 DIAGNOSIS — I10 PRIMARY HYPERTENSION: ICD-10-CM

## 2024-12-16 DIAGNOSIS — E11.65 TYPE 2 DIABETES MELLITUS WITH HYPERGLYCEMIA, WITH LONG-TERM CURRENT USE OF INSULIN: ICD-10-CM

## 2024-12-16 DIAGNOSIS — M35.00 SJOGREN'S SYNDROME, WITH UNSPECIFIED ORGAN INVOLVEMENT (MULTI): ICD-10-CM

## 2024-12-16 DIAGNOSIS — Z79.4 TYPE 2 DIABETES MELLITUS WITH HYPERGLYCEMIA, WITH LONG-TERM CURRENT USE OF INSULIN: ICD-10-CM

## 2024-12-16 DIAGNOSIS — E78.49 OTHER HYPERLIPIDEMIA: Primary | ICD-10-CM

## 2024-12-16 DIAGNOSIS — M06.9 RHEUMATOID ARTHRITIS, INVOLVING UNSPECIFIED SITE, UNSPECIFIED WHETHER RHEUMATOID FACTOR PRESENT (MULTI): ICD-10-CM

## 2024-12-16 LAB
POC FINGERSTICK BLOOD GLUCOSE: 371 MG/DL (ref 70–100)
POC HEMOGLOBIN A1C: 14 % (ref 4.2–6.5)

## 2024-12-16 PROCEDURE — 83036 HEMOGLOBIN GLYCOSYLATED A1C: CPT | Performed by: INTERNAL MEDICINE

## 2024-12-16 PROCEDURE — 1036F TOBACCO NON-USER: CPT | Performed by: INTERNAL MEDICINE

## 2024-12-16 PROCEDURE — 99214 OFFICE O/P EST MOD 30 MIN: CPT | Performed by: INTERNAL MEDICINE

## 2024-12-16 PROCEDURE — 3074F SYST BP LT 130 MM HG: CPT | Performed by: INTERNAL MEDICINE

## 2024-12-16 PROCEDURE — 3079F DIAST BP 80-89 MM HG: CPT | Performed by: INTERNAL MEDICINE

## 2024-12-16 PROCEDURE — 82962 GLUCOSE BLOOD TEST: CPT | Performed by: INTERNAL MEDICINE

## 2024-12-16 RX ORDER — INSULIN GLARGINE 100 [IU]/ML
20 INJECTION, SOLUTION SUBCUTANEOUS NIGHTLY
Qty: 3 ML | Refills: 3 | Status: SHIPPED | OUTPATIENT
Start: 2024-12-16 | End: 2024-12-17

## 2024-12-16 RX ORDER — SEMAGLUTIDE 1.34 MG/ML
1 INJECTION, SOLUTION SUBCUTANEOUS
Qty: 3 ML | Refills: 0 | Status: SHIPPED | OUTPATIENT
Start: 2024-12-16

## 2024-12-16 RX ORDER — AMLODIPINE AND BENAZEPRIL HYDROCHLORIDE 10; 40 MG/1; MG/1
1 CAPSULE ORAL DAILY
Qty: 90 CAPSULE | Refills: 1 | Status: SHIPPED | OUTPATIENT
Start: 2024-12-16

## 2024-12-16 NOTE — PROGRESS NOTES
Adeola Moody is a 64 y.o. female   Patient with a past medical history of HTN, HLD, DM, RA, Sjogren, Depression, DJD, Colonoscopy in 2030, Mammogram (Nov)    Sugars have been high  Feels thirsty all the time  Drinks a lot of pop           Review of Systems     Constitutional: not feeling poorly, no fever, no recent weight gain and no recent weight loss.   Eyes: no blurred vision and no diplopia.   ENT: no hearing loss, no tinnitus, no earache, no sore throat, no hoarseness and no swollen glands in the neck.   Cardiovascular: no chest pain, no tightness or heavy pressure, no shortness of breath, no palpitations and no lower extremity edema.   Respiratory: no cough, wheezing or shortness of breath at rest or exertion  Gastrointestinal: no change in bowel habits, no diarrhea, no constipation, no bloody stools, no nausea, no vomiting, no abdominal pain, no signs and symptoms of ulcer disease, no cali colored stools and no intolerance to fatty foods.   Genitourinary: no dysuria, no hematuria, no burning sensation during urination, urinary stream is not smaller and urinary stream does not start and stop.   Musculoskeletal: no arthralgias,  no muscle weakness, no back pain and no difficulty walking.   Skin: no rashes, no change in skin color and pigmentation, no skin lesions and no skin lumps.   Neurological: no headaches, no dizziness, no seizures, no tingling, no numbness, no signs and symptoms of stroke and no limb weakness.   Psychiatric: no confusion, no memory lapses or loss, no depression and no sleep disturbances.   Endocrine: ,  no dry skin, no cold intolerance, no heat intolerance a  Hematologic/Lymphatic: is not slow to heal, does not bleed easily, does not bruise easily, no thrombophlebitis, no anemia and no history of blood transfusion.   All other systems have been reviewed and are negative for complaint.     Current Outpatient Medications   Medication Instructions    acetaminophen (Tylenol 8 HOUR)  650 mg ER tablet 1 tablet, 3 times daily (0900,1400,1900)    albuterol 90 mcg/actuation inhaler INHALE 1-2 PUFS EVERY 4 TO 6 HOURS AS NEEDED FOR WHEEZING OR SHORTNESS OF BREATH FOR UP TO 30 DAYS    amLODIPine-benazepriL (Lotrel) 10-40 mg capsule 1 capsule, oral, Daily    amoxicillin (Amoxil) 875 mg tablet 1 tablet, oral, 2 times daily    atorvastatin (Lipitor) 10 mg tablet 1 tablet, Daily    atorvastatin (LIPITOR) 20 mg, oral, Daily    benzonatate (Tessalon) 100 mg capsule SWALLOW WHOLE TAKE 1 CAPSULE 3 TIMES A DAY AS NEEDED *DO NOT BREAK CHEW, DISSOLVE, CUT, OR CRUSH*    cyclobenzaprine (FLEXERIL) 10 mg, Every 8 hours PRN    cycloSPORINE (Restasis) 0.05 % ophthalmic emulsion 1 drop, Both Eyes, 2 times daily    dextran 70-hypromellose, PF, (Bion Tears) 0.1-0.3 % ophthalmic solution 1 drop, Both Eyes, 4 times daily    diazePAM (Valium) 5 mg tablet Take by mouth.    diclofenac sodium 1.5 % drops transdermal    estradiol (Estrace) 0.01 % (0.1 mg/gram) vaginal cream vaginal    fluticasone (Flonase) 50 mcg/actuation nasal spray 1 spray, nasal, 2 times daily    FreeStyle Lite Strips strip 2 times daily    furosemide (Lasix) 20 mg tablet     gabapentin (Neurontin) 100 mg capsule     hydroxychloroquine (Plaquenil) 200 mg tablet Every 12 hours    ibuprofen 600 mg, Every 6 hours PRN    leflunomide (Arava) 10 mg tablet Take by mouth.    metFORMIN (Glucophage) 500 mg tablet 1 tablet, 2 times daily    methocarbamol (ROBAXIN) 750 mg, oral, 4 times daily    mirabegron (Mybetriq) 25 mg tablet extended release 24 hr 24 hr tablet oral    mometasone (Nasonex) 50 mcg/actuation nasal spray 2 sprays, Each Nostril, Daily    naproxen (Naprosyn) 500 mg tablet 1 tablet, oral, 2 times daily (morning and late afternoon)    olopatadine (Patanol) 0.1 % ophthalmic solution 1 drop, Both Eyes, 2 times daily PRN    oxybutynin (Ditropan) 5 mg tablet 1 tablet, oral, Daily    Ozempic 1 mg, subcutaneous, Once Weekly    PARoxetine (Paxil) 10 mg tablet  Take by mouth.    pilocarpine (Salagen) 5 mg tablet 1 tablet, oral, 2 times daily    progesterone (Prometrium) 100 mg capsule Take by mouth.    tirzepatide (Mounjaro) 2.5 mg/0.5 mL pen injector Inject under the skin.    zolpidem (Ambien) 5 mg tablet Take by mouth.       There were no vitals filed for this visit.       Physical Exam     Constitutional   General appearance: Alert and in no acute distress.     Pulmonary   Respiratory assessment: No respiratory distress, normal respiratory rhythm and effort.    Auscultation of Lungs: Clear bilateral breath sounds.   Cardiovascular   Auscultation of heart: Apical pulse normal, heart rate and rhythm normal, normal S1 and S2, no murmurs and no pericardial rub.    Exam for edema: No peripheral edema.   Abdomen   Abdominal Exam: No bruits, normal bowel sounds, soft, non-tender, no abdominal mass palpated.    Liver and Spleen exam: No hepato-splenomegaly.   Musculoskeletal   Examination of gait: Normal.    Inspection of digits and nails: No clubbing or cyanosis of the fingernails.    Inspection/palpation of joints, bones and muscles: No joint swelling. Normal movement of all extremities.   Skin   Skin inspection: Normal skin color and pigmentation, normal skin turgor and no visible rash.   Neurologic   Cranial nerves: Nerves 2-12 were intact, no focal neuro defects.       Assessment/Plan      Problem List Items Addressed This Visit    None         Patient with a past medical history of HTN, HLD, DM, RA, Sjogren, Depression, DJD, Colonoscopy in 2030, Mammogram (Nov)        # DM/ Morbid Obesity  A1c of 14  Increase Ozempic to 2 mg  Start Lantus 20 units subcutaneous at bedtime  Nutrition consult            # HTN  Stable  Continue current medications           # HLD  Cont Rx  Watch salt, avoid excessive caffeine  Avoid processed meats/ sugars/juices Instead eat fresh fruit  Add walnuts and almonds to your diet  exercise 6 days a week for 30 minutes           # Rheumatoid  Arthritis/ Sjogrens  Not on Arava or Plaquenil  Refer back to Rheumatology     Blood work  CT calcium score

## 2024-12-17 RX ORDER — INSULIN DEGLUDEC 100 U/ML
20 INJECTION, SOLUTION SUBCUTANEOUS NIGHTLY
Qty: 3 EACH | Refills: 2 | Status: SHIPPED | OUTPATIENT
Start: 2024-12-17

## 2024-12-17 NOTE — TELEPHONE ENCOUNTER
PATIENT CALLED AND STATED ASK FOR  TO SEND OVER A DIFFERENT BRAND OF INSULIN FOR PATIENT TO PHARMACY

## 2024-12-27 ENCOUNTER — TELEPHONE (OUTPATIENT)
Dept: PRIMARY CARE | Facility: CLINIC | Age: 64
End: 2024-12-27

## 2024-12-27 DIAGNOSIS — Z79.4 TYPE 2 DIABETES MELLITUS WITH HYPERGLYCEMIA, WITH LONG-TERM CURRENT USE OF INSULIN: Primary | ICD-10-CM

## 2024-12-27 DIAGNOSIS — E11.65 TYPE 2 DIABETES MELLITUS WITH HYPERGLYCEMIA, WITH LONG-TERM CURRENT USE OF INSULIN: Primary | ICD-10-CM

## 2024-12-27 RX ORDER — INSULIN GLARGINE 100 [IU]/ML
20 INJECTION, SOLUTION SUBCUTANEOUS NIGHTLY
Qty: 3 ML | Refills: 12 | Status: SHIPPED | OUTPATIENT
Start: 2024-12-27 | End: 2025-12-27

## 2025-01-06 ENCOUNTER — APPOINTMENT (OUTPATIENT)
Dept: RADIOLOGY | Facility: HOSPITAL | Age: 65
End: 2025-01-06
Payer: COMMERCIAL

## 2025-01-23 ENCOUNTER — TELEMEDICINE (OUTPATIENT)
Dept: PRIMARY CARE | Facility: CLINIC | Age: 65
End: 2025-01-23
Payer: COMMERCIAL

## 2025-01-23 ENCOUNTER — TELEPHONE (OUTPATIENT)
Dept: PRIMARY CARE | Facility: CLINIC | Age: 65
End: 2025-01-23

## 2025-01-23 DIAGNOSIS — J20.9 ACUTE BRONCHITIS, UNSPECIFIED ORGANISM: Primary | ICD-10-CM

## 2025-01-23 PROCEDURE — 99213 OFFICE O/P EST LOW 20 MIN: CPT | Performed by: INTERNAL MEDICINE

## 2025-01-23 RX ORDER — AMOXICILLIN 875 MG/1
875 TABLET, FILM COATED ORAL 2 TIMES DAILY
Qty: 14 TABLET | Refills: 0 | Status: SHIPPED | OUTPATIENT
Start: 2025-01-23 | End: 2025-01-30

## 2025-01-23 NOTE — PROGRESS NOTES
Adeola Moody is a 64 y.o. female   Patient with a past medical history of HTN, HLD, DM, RA, Sjogren, Depression, DJD, Colonoscopy in 2030, Mammogram (Nov)    Consent: This visit was initiated by the patient. Audio and visual communication was utilized in real-time. I confirmed understanding of risks and benefits of telehealth visits and obtained consent to proceed with the telemedicine visit.      Symptoms started on Saturday  Sore throat  Body aches  Congestion  Fever of ??  But felt feverish  No NVD  No sick contacts  Covid negative  Mild SOB    Virtual visit: Level 3 exam  CONSTITUTIONAL: Patient appears well developed and well nourished.   GENERAL: This is a healthy appearing individual who appears stated age. The patient is alert and appropriately verbally conversant without hoarseness. This patient is in no apparent distress. Not acutely ill or toxic appearing.   FACE: The face was inspected and no cutaneous masses or lesions were visualized. There was no erythema or edema noted. Facial movement was symmetric.   EYES: Extra-ocular muscle function was intact. No nystagmus was observed. Pupils were equal.   CRANIAL NERVES: Cranial nerves II, III, IV, and VI were noted to be intact via extra-ocular muscle movement testing. Cranial nerve VII noted to be intact and symmetric by facial movement. Cranial nerve VIII was tested with whispered voice examination and revealed symmetric hearing. Cranial nerves IX and X noted to be intact by gag reflex and palatal movement. Cranial nerve XII noted to be intact by active and symmetric tongue movement.   NOSE: Examination of the nose revealed the nasal dorsum to be midline.   ORAL CAVITY: Examination of the oral cavity revealed no mass lesions nor infection. The palate was noted to be intact. The tongue exhibited normal mobility. Mucosa was moist without lesion. The lips were free of lesion. Gums were free of inflammation.   Dentition: normal without obvious  infection or inflammation, overbite/overjet, retrognathia  OROPHARYNX: The oral pharynx was free of mass lesion or mucosal abnormality. The palate was noted to be without lesion. The uvula was normal appearing.   EARS: Examination of the ears revealed that the auricles were normally formed with no lesions.   NECK: No skin lesions or inflammatory processes were detected on visual inspection.  CARDIOVASCULAR: Peripheral perfusion intact, no evidence of cyanosis, clubbing or edema.   RESPIRATORY: Normal inspiration and expiration and chest wall expansion, no use of accessory muscles to breathe, no stridor.  NEUROLOGICAL: Mentation is clear. Alert and oriented to time, place, and person. Answering questions appropriately.  PSYCHIATRIC: Normal affect and appropriate behavior. Mood is without obvious agitation or disturbance. No evidence of obvious depressive behaviors.      # Ac Bronchitis  Hydrate  Mucinex  Fluids  Amoxil BID  x 7 days    total time spent reviewing chart, labs and coordinating care was  15 minutes

## 2025-01-29 NOTE — PROGRESS NOTES
"Rheumatology Note      Patient Adeola Moody  MRN [id number]     CC: Ms. Adeola Moody is a 64 y.o. female with history of seronegative inflammatory arthritis, Sjogrens (+IRENE 1:160 2013 - negative on repeat with negative RIYA panel) who presents to the clinic for re-establishment of care. Referred by PCP. Last seen by  rheumatology in 2020.    Recall:  Patient was previously following with  rheumatology from prior to 2013 until 2020 for seronegative rheumatoid arthritis and Sjogren's. Patient was last taking Plaquenil 200 mg BID and pilocarpine 2.5 mg for sicca symptoms.    Past rheum meds  Plaquenil 200 mg twice daily (was taking on and off)  MTX (discontinued July 2016 as she has elevated LFTs with h/o ETOH use-Hepatitis B/C negative, AMA neg)  Leflunomide - d/c'd Jan 2018 due to noncompliance     Subjective    Subjective   History of Presenting Illness  Patient is here to re-establish care. Patient reports that she is here because her PCP Dr. Teague wanted her to come in.    Overall, patient seems to be doing okay.  Says she does not have the same swelling that she used to have when she was symptomatic.  She has been off of all of her medications including Plaquenil and pilocarpine since 2020.  She does say that now and again, she does have achiness in her joints.  Today her ankles and her hands are bothersome.  Pain seems to float around.  Also notes that her right hip seems to be \"catching \"and gets painful until she stretches it out and then pain goes away.  It happens about 3-4 times a day.  Outside of that she has pain on both hips when she is laying on them.  Regarding her hands, the thumbs have been the most bothersome and worse in the winter. Pain is not worse at a certain time of the day. Stiffness in the morning about 10-15 minutes.     In 2/2024, patient fell at work and feels like she still gets achiness at times. Tripped on a rug and fell on her left side. Sometimes wakes up at " night with pain in the left arm and hips and back, all since her fall. Of note, patient was seeing orthopedic surgery last fall for left shoulder strain with tendinosis of the left rotator cuff and biceps tendinitis on the left. She has completed a course of physical therapy and has della unable to get a steroid injection due to workman's comp case.     Does get dry eyes and is using artificial tears and Restasis which helps.  She last saw ophthalmology in 11/2024 and was noted to have epithelial erosions.  She feels like her farsighted vision has been more blurry.  She has a follow-up appointment next week.  Regarding dry mouth, patient does still have symptoms.  She does use Biotene at times which helps but does not use it consistently. No issues with cavities or dental loss.    ROS:  Per HPI    PMH: Diabetes, hypertension, hyperlipidemia  PSH: 2 myomectomies  Smoking: No  Alcohol use: Social drinker  Works as substitute pre-    Past Medical History:   Diagnosis Date    Allergic rhinitis, unspecified 01/10/2018    Allergic rhinitis    Allergic rhinitis, unspecified 01/10/2018    Allergic rhinitis    Anesthesia of skin 01/10/2018    Numbness and tingling of foot    Anesthesia of skin 01/10/2018    Numbness and tingling of foot    Asymptomatic menopausal state 01/10/2018    Postmenopausal    Asymptomatic menopausal state 01/10/2018    Postmenopausal    Contact with and (suspected) exposure to infections with a predominantly sexual mode of transmission 01/10/2018    Possible exposure to STD    Contact with and (suspected) exposure to infections with a predominantly sexual mode of transmission 01/10/2018    Possible exposure to STD    Cramp and spasm 01/10/2018    Muscle cramps    Cramp and spasm 01/10/2018    Muscle cramps    Depression, unspecified 01/10/2018    Depression    Depression, unspecified 01/10/2018    Depression    Dermatitis, unspecified 01/10/2018    Dermatitis    Dermatitis, unspecified  01/10/2018    Dermatitis    Dry eye syndrome of bilateral lacrimal glands 01/10/2018    Dry eyes    Elevation of levels of liver transaminase levels 01/10/2018    Transaminitis    Elevation of levels of liver transaminase levels 01/10/2018    Transaminitis    Encounter for screening for infections with a predominantly sexual mode of transmission 01/10/2018    Screen for STD (sexually transmitted disease)    Encounter for screening for infections with a predominantly sexual mode of transmission 01/10/2018    Screen for STD (sexually transmitted disease)    Encounter for screening for malignant neoplasm of cervix 01/10/2018    Pap smear for cervical cancer screening    Encounter for screening for malignant neoplasm of cervix 01/10/2018    Pap smear for cervical cancer screening    Encounter for screening for malignant neoplasm of colon 01/10/2018    Encounter for colonoscopy due to history of adenomatous colonic polyps    Encounter for screening for malignant neoplasm of colon 01/10/2018    Encounter for colonoscopy due to history of adenomatous colonic polyps    Encounter for screening mammogram for malignant neoplasm of breast 01/10/2018    Visit for screening mammogram    Encounter for screening mammogram for malignant neoplasm of breast 01/10/2018    Visit for screening mammogram    Essential (primary) hypertension 01/10/2018    Hypertension    Flushing 01/10/2018    Hot flashes    Flushing 01/10/2018    Hot flashes    Hyperlipidemia, unspecified 01/10/2018    Hyperlipidemia    Hyperlipidemia, unspecified 01/10/2018    Hyperlipidemia    Irregular menstruation, unspecified 01/10/2018    Irregular menstrual cycle    Irregular menstruation, unspecified 01/10/2018    Irregular menstrual cycle    Lesion of sciatic nerve, unspecified lower limb 01/10/2018    Piriformis syndrome    Lesion of sciatic nerve, unspecified lower limb 01/10/2018    Piriformis syndrome    Menopausal and female climacteric states 02/07/2020     Menopausal symptoms    Menopausal and female climacteric states 01/10/2018    Perimenopause    Menopausal and female climacteric states 01/10/2018    Perimenopause    Other fatigue 01/10/2018    Fatigue    Other fatigue 01/10/2018    Fatigue    Other long term (current) drug therapy 01/10/2018    High risk medication use    Other specified abnormal findings of blood chemistry 01/10/2018    Elevated liver function tests    Other specified abnormal findings of blood chemistry 01/10/2018    Elevated liver function tests    Other specified noninflammatory disorders of vagina 01/10/2018    Vaginal dryness    Other specified noninflammatory disorders of vagina 01/10/2018    Vaginal dryness    Person with feared health complaint in whom no diagnosis is made 01/10/2018    Concern about diabetes mellitus without diagnosis    Person with feared health complaint in whom no diagnosis is made 01/10/2018    Concern about diabetes mellitus without diagnosis    Plantar fascial fibromatosis 01/10/2018    Plantar fasciitis    Plantar fascial fibromatosis 01/10/2018    Bilateral plantar fasciitis    Plantar fascial fibromatosis 01/10/2018    Bilateral plantar fasciitis    Plantar fascial fibromatosis 01/10/2018    Plantar fasciitis    Pneumonia, unspecified organism 01/10/2018    CAP (community acquired pneumonia)    Pneumonia, unspecified organism 01/10/2018    CAP (community acquired pneumonia)    Polyarthritis, unspecified 07/21/2013    Polyarthropathy    Polyp of colon 07/21/2013    Benign colonic polyp    Postmenopausal atrophic vaginitis 06/26/2020    Vaginal atrophy    Rheumatoid arthritis, unspecified 12/06/2021    Rheumatoid arthritis    Sciatica, unspecified side 01/10/2018    Acute sciatica    Sciatica, unspecified side 01/10/2018    Acute sciatica    Sjogren syndrome, unspecified (Multi) 01/10/2018    Sjogren's syndrome    Sleep apnea, unspecified 01/10/2018    Sleep apnea    Sleep apnea, unspecified 01/10/2018    Sleep  apnea    Trochanteric bursitis, unspecified hip 01/10/2018    Trochanteric bursitis    Trochanteric bursitis, unspecified hip 01/10/2018    Trochanteric bursitis    Type 2 diabetes mellitus without complications (Multi) 01/10/2018    Diabetes mellitus, type 2    Unspecified urinary incontinence 01/10/2018    Urinary incontinence    Unspecified urinary incontinence 01/10/2018    Urinary incontinence    Vitamin D deficiency, unspecified 01/10/2018    Vitamin D deficiency    Vitamin D deficiency, unspecified 01/10/2018    Vitamin D deficiency        Allergies   Allergen Reactions    Latex Unknown    Other Unknown    Ragweed Unknown    Sulfa (Sulfonamide Antibiotics) Unknown        Objective   Objective     /83 (BP Location: Left arm, Patient Position: Sitting, BP Cuff Size: Adult)   Pulse 78   Temp 36.8 °C (98.3 °F) (Temporal)   SpO2 93%      PHYSICAL EXAM:  General - NAD, pleasant, AAOx3  Head: Normocephalic, atraumatic  Eyes - PERRLA, EOMI. No conjunctiva injection.   Mouth/ENT - Moist oral and nasal mucosa. No facial rash. Adequate salivary pooling  Skin - No rashes or ulcers. No erythema on bilateral cheeks.  Abdomen - Soft, non-tender. No masses.   Extremities - No edema, cyanosis ,or clubbing  Neurological - Alert and oriented x3,  grossly intact. No focal deficit.    Musculoskeletal  Shoulders: Tenderness to palpation of left shoulder. Full ROM of right shoulder without tenderness  Elbows: Full ROM, without pain, no swelling, warmth or tenderness.  Wrists/Hands: Tenderness of the right CMC. Full ROM, without pain, no swelling, warmth or tenderness.  Hands : 5/5.    Hips: Full ROM.  No malalignment.  Knees:  Full ROM, without pain, no swelling, warmth or point tenderness.   Ankles: Full ROM, without pain, swelling, warmth or tenderness.  Cervical spine: No tenderness or limitation of movement  Lumbar spine: Tenderness of paraspinal myofascia      LABS:  Lab Results   Component Value Date    WBC 5.0  "04/27/2023    HGB 11.3 (L) 04/27/2023    HCT 34.7 (L) 04/27/2023    MCV 95 04/27/2023     04/27/2023      Lab Results   Component Value Date    GLUCOSE 75 04/27/2023    CO2 27 04/27/2023    BUN 14 04/27/2023    CALCIUM 9.7 04/27/2023    ALBUMIN 4.4 04/27/2023      No results found for: \"CRP\"  No results found for: \"SEDRATE\"  No results found for: \"C3\", \"C4\"  No results found for: \"RF\", \"CITAB\"  No results found for: \"ANATITER\", \"ARNP\", \"ASMRN\", \"ASSA\", \"ASSB\", \"ASCL\", \"JO1\", \"ACHR\", \"ACEN\", \"RIBPP\", \"DNADS\", \"ANCPA\", \"ANCTI\", \"PR3\", \"MPO\"    Lab Results   Component Value Date    PROTUR NEGATIVE 01/25/2022    GLUCOSEU 150(1+) (A) 01/25/2022    BLOODU NEGATIVE 01/25/2022    KETONESU NEGATIVE 01/25/2022    NITRITEU NEGATIVE 01/25/2022    LEUKOCYTESU NEGATIVE 01/25/2022     Lab Results   Component Value Date    UTPCR 0.07 11/11/2021        No results found for: \"URICACID\"  No results found for: \"COLORFL\", \"CLARITYFLUID\", \"WBCFL\", \"NEUTROBFREL\", \"LYMPHSBFREL\", \"EOSBFREL\", \"BASOBFREL\", \"PLASMACFLD\", \"RBCFL\", \"CRYSFL\"    IMAGING:  MRI left shoulder 4/6/2024  1.  Rotator cuff tendinosis with low-grade partial thickness tearing in   the supraspinatus tendon.   2.  Partial tearing of the long head biceps tendon with mild   tenosynovitis.   3. Moderate acromioclavicular joint degenerative arthritis.     CT cervical spine 3/7/2024  NO EVIDENCE OF CERVICAL SPINE FRACTURE.  GRADE 1 ANTEROLISTHESIS OF C4 ON C5.  POSTERIOR ELEMENT DEGENERATIVE CHANGES C3-C5.     CT thoracic spine 3/7/2024  NO EVIDENCE OF THORACIC SPINE FRACTURE.  DEGENERATIVE CHANGES OF THE ENDPLATES.     CT LUMBAR SPINE 3/7/2024  Normal    Assessment    Assessment & Plan   Ms. Adeola Moody is a 64 y.o. female with history of HTN, HLD, DM, DJD, seronegative inflammatory arthritis, Sjogrens (+IRENE 1:160 2013 - negative on repeat with negative RIYA panel) who presents to the clinic for re-establishment of care. Referred by PCP. Last seen by  " "rheumatology in 2020.    Patient was last taking Plaquenil 200 mg daily and pilocarpine 2.5 mg for sicca symptoms. Labs reviewed on [facility] with IRENE 1:160 in 2013, negative on repeat. Negative IRYA panel, RF, ACPA. CRP 2.3 in 2013. Patient was lost to follow up in 2020 and has been off of her medications since.    Today, she does described pains that seem more mechanical (worse with cold weather, improve with rest, stiffness limited to 10-15 minutes). Her inflammatory symptoms have not returned. There is no evidence of synovitis on exam.     #Seronegative inflammatory arthritis  - Off of medications since 2020  - No evidence of active inflammatory arthritis  - Will remain off of prior medications    #Right hip pain  #Hand pain  #Left shoulder pain  - Current pain seems to be more mechanical than inflammatory  - Notes that her right hip \"catches\" after sitting for a period of time  - Possible etiologies include impingement, tight IT band. Provided document with stretching exercises and will obtain XR of right hip. If it does not improve, consider PT   - Prescribed diclofenac gel, has been helpful in the past    #Sjogrens  - last ophtho exam 11/6/2024 with +1 punctate epithelial erosions (PEE) and prescribed restasis and artifical tears which seems to be helping  - Has appointment with eye doctor next week  - For dry mouth, use biotene more consistently    RTC in 1 year, earlier if change or worsening in symptoms. Discussed seeing intermittently versus scheduling appointment if symptoms recur, patient prefers to be seen intermittently for monitoring    Case seen and discussed with Dr. Londono  Signature: Yadira Covington, DO  Date: January 30, 2025   "

## 2025-01-30 ENCOUNTER — APPOINTMENT (OUTPATIENT)
Dept: RHEUMATOLOGY | Facility: CLINIC | Age: 65
End: 2025-01-30
Payer: COMMERCIAL

## 2025-01-30 ENCOUNTER — APPOINTMENT (OUTPATIENT)
Dept: PRIMARY CARE | Facility: CLINIC | Age: 65
End: 2025-01-30
Payer: COMMERCIAL

## 2025-01-30 VITALS
HEART RATE: 78 BPM | SYSTOLIC BLOOD PRESSURE: 130 MMHG | OXYGEN SATURATION: 93 % | TEMPERATURE: 98.3 F | DIASTOLIC BLOOD PRESSURE: 83 MMHG

## 2025-01-30 DIAGNOSIS — M35.00 SJOGREN'S SYNDROME, WITH UNSPECIFIED ORGAN INVOLVEMENT (MULTI): ICD-10-CM

## 2025-01-30 DIAGNOSIS — E13.69 OTHER SPECIFIED DIABETES MELLITUS WITH OTHER SPECIFIED COMPLICATION, UNSPECIFIED WHETHER LONG TERM INSULIN USE (MULTI): ICD-10-CM

## 2025-01-30 DIAGNOSIS — Z71.3 DIETARY COUNSELING AND SURVEILLANCE: Primary | ICD-10-CM

## 2025-01-30 DIAGNOSIS — M25.551 RIGHT HIP PAIN: Primary | ICD-10-CM

## 2025-01-30 DIAGNOSIS — E78.49 OTHER HYPERLIPIDEMIA: ICD-10-CM

## 2025-01-30 DIAGNOSIS — I10 PRIMARY HYPERTENSION: ICD-10-CM

## 2025-01-30 DIAGNOSIS — M06.9 RHEUMATOID ARTHRITIS, INVOLVING UNSPECIFIED SITE, UNSPECIFIED WHETHER RHEUMATOID FACTOR PRESENT (MULTI): ICD-10-CM

## 2025-01-30 PROCEDURE — 99204 OFFICE O/P NEW MOD 45 MIN: CPT

## 2025-01-30 PROCEDURE — 3075F SYST BP GE 130 - 139MM HG: CPT

## 2025-01-30 PROCEDURE — 97802 MEDICAL NUTRITION INDIV IN: CPT

## 2025-01-30 PROCEDURE — 3079F DIAST BP 80-89 MM HG: CPT

## 2025-01-30 PROCEDURE — 1036F TOBACCO NON-USER: CPT

## 2025-01-30 RX ORDER — DICLOFENAC SODIUM 10 MG/G
4 GEL TOPICAL 4 TIMES DAILY
Qty: 350 G | Refills: 1 | Status: SHIPPED | OUTPATIENT
Start: 2025-01-30

## 2025-01-30 RX ORDER — TRIAMCINOLONE ACETONIDE 40 MG/ML
INJECTION, SUSPENSION INTRA-ARTICULAR; INTRAMUSCULAR ONCE
Status: CANCELLED | OUTPATIENT
Start: 2025-01-30 | End: 2025-01-30

## 2025-01-30 ASSESSMENT — PAIN SCALES - GENERAL: PAINLEVEL_OUTOF10: 0-NO PAIN

## 2025-01-30 NOTE — PROGRESS NOTES
Nutrition Initial Assessment:     Patient Adeola Moody is a 64 y.o. female being seen at [facility]  who was referred by Dr. Teague for   1. Other specified diabetes mellitus with other specified complication, unspecified whether long term insulin use (Multi)  Referral to Nutrition Services      2. Other hyperlipidemia  Referral to Nutrition Services      3. Primary hypertension  Referral to Nutrition Services          Nutrition Assessment    Patient Active Problem List   Diagnosis    Chronic sinusitis    Bilateral plantar fasciitis    Depression    Other specified diabetes mellitus with other specified complication, unspecified whether long term insulin use (Multi)    Dry eyes    Hip arthritis    Hot flashes    Hyperlipidemia    Hypertension    Mixed incontinence    Morbid obesity (Multi)    Arthritis    Rheumatoid arthritis    Sjogren's syndrome    Sleep apnea    Vitamin D deficiency    Acute sciatica    Piriformis syndrome    Allergic rhinitis    Anxiety    BRBPR (bright red blood per rectum)    CAP (community acquired pneumonia)    Chest pain    Chronic headache    Dyspepsia    Elevated blood sugar    Fatigue    Irregular menstrual cycle    PMB (postmenopausal bleeding)    Menopausal symptoms    Perimenopause    Muscle cramps    Numbness and tingling of foot    Positive IRENE (antinuclear antibody)    Pruritus of vagina    Tendinitis of right shoulder    Vaginal atrophy    Elevated liver function tests    Transaminitis    Transient insomnia    Trochanteric bursitis    Urinary urgency    Vaginal dryness    Urinary incontinence    Dermatitis    Plantar fasciitis    Mild intermittent asthma without complication (HHS-HCC)    History of colonic polyps    History of adenomatous polyp of colon    Hemorrhoids    Diverticulosis of large intestine    Benign colonic polyp    Long-term (current) use of injectable non-insulin antidiabetic drugs    Uncomplicated asthma (HHS-HCC)    Obesity with body mass  "index 30 or greater    Acute bronchitis     Food and Nutrition Related History  Reports extreme thirst and blurry vision   Has a nephew with T1DM     Allergies: None  Intolerance: None  Appetite: Fair   GI Symptoms : constipation  Swallowing Difficulty: No problems with swallowing  Dentition : own  Food Preparation: Patient  Cooking Skills/Barriers: None reported  Grocery Shopping: Patient  Supplements: Denies   Food Insecurity: Denies     Dietary Recall:   Meal 1: skipped today // typical would be 1 pancake with syrup and a couple pieces bueno   Meal 2: 2 tbsp spinach, 2 tbsp mashed potatoes, 1 slice roast beef     Snacks: candy   Beverages: water, pop (has reduced significantly), orange juice       Diabetes Nutrition History:  Diagnosed maybe 5 years ago   Prior Nutrition Education: No   SMBG: meter   Hypoglycemia: Denies   Current DM Medications/Insulin Regimen:  Ozempic 2 mg once weekly      Anthropometrics:  Ht Readings from Last 1 Encounters:   01/31/25 1.626 m (5' 4\")     BMI Readings from Last 1 Encounters:   01/31/25 33.85 kg/m²     Weight History:   Daily Weight  12/16/24 : 89.4 kg (197 lb 3.2 oz)  12/11/24 : 90.7 kg (199 lb 15.3 oz)  11/27/24 : 90.7 kg (200 lb)  09/12/24 : 93.9 kg (207 lb)  04/30/24 : 84.1 kg (185 lb 6.4 oz)  01/25/24 : 81.1 kg (178 lb 12.8 oz)  10/23/23 : 82.4 kg (181 lb 9.6 oz)  08/14/23 : 82.6 kg (182 lb)  07/17/23 : 83.6 kg (184 lb 6.4 oz)  05/03/23 : 84.6 kg (186 lb 8.2 oz)      Nutrition Significant Labs:  CMP Trend:    Recent Labs     04/27/23  1235 11/11/21  1323 01/05/18  0000   GLUCOSE 75 94 87    142 139   K 4.2 4.2 4.2    106 104   CO2 27 27 30   ANIONGAP 12 13 9*   BUN 14 16 13   CREATININE 0.83 0.74 0.76   CALCIUM 9.7 9.1 10.1   ALBUMIN 4.4 4.2 4.5   ALKPHOS 56 54 49   PROT 7.6 6.9 7.5   AST 18 24 23   BILITOT 0.5 0.4 0.4   ALT 22 35 42     DM Specific Labs Trend (includes HgbA1C):   Recent Labs     12/16/24  1726 04/30/24  1646 01/25/24  1727   HGBA1C 14.0* 6.1 " 5.9     Lipid Panel Trend:    Recent Labs     01/25/24  1725 10/23/23  1256 04/27/23  1235 11/11/21  1323   CHOL 178 197 195 150   HDL 51* 68* 47.3 60.5   LDLCALC 104* 112*  --   --    LDLF  --   --  130* 74   VLDL  --   --  18 16   TRIG 114 88 89 78         Medications:  Current Outpatient Medications   Medication Instructions    acetaminophen (Tylenol 8 HOUR) 650 mg ER tablet 1 tablet, 3 times daily (0900,1400,1900)    albuterol 90 mcg/actuation inhaler INHALE 1-2 PUFS EVERY 4 TO 6 HOURS AS NEEDED FOR WHEEZING OR SHORTNESS OF BREATH FOR UP TO 30 DAYS    amLODIPine-benazepriL (Lotrel) 10-40 mg capsule 1 capsule, oral, Daily    atorvastatin (Lipitor) 10 mg tablet 1 tablet, Daily    atorvastatin (LIPITOR) 20 mg, oral, Daily    benzonatate (Tessalon) 100 mg capsule SWALLOW WHOLE TAKE 1 CAPSULE 3 TIMES A DAY AS NEEDED *DO NOT BREAK CHEW, DISSOLVE, CUT, OR CRUSH*    cyclobenzaprine (FLEXERIL) 10 mg, Every 8 hours PRN    cycloSPORINE (Restasis) 0.05 % ophthalmic emulsion 1 drop, Both Eyes, 2 times daily    dextran 70-hypromellose, PF, (Bion Tears) 0.1-0.3 % ophthalmic solution 1 drop, Both Eyes, 4 times daily    diazePAM (Valium) 5 mg tablet Take by mouth.    diclofenac sodium (VOLTAREN ARTHRITIS PAIN) 4 g, Topical, 4 times daily    diclofenac sodium 1.5 % drops transdermal    estradiol (Estrace) 0.01 % (0.1 mg/gram) vaginal cream vaginal    fluticasone (Flonase) 50 mcg/actuation nasal spray 1 spray, nasal, 2 times daily    FreeStyle Lite Strips strip 2 times daily    furosemide (Lasix) 20 mg tablet     gabapentin (Neurontin) 100 mg capsule     ibuprofen 600 mg, Every 6 hours PRN    insulin glargine (LANTUS) 20 Units, subcutaneous, Nightly, Take as directed per insulin instructions.    methocarbamol (ROBAXIN) 750 mg, oral, 4 times daily    mirabegron (Mybetriq) 25 mg tablet extended release 24 hr 24 hr tablet oral    mometasone (Nasonex) 50 mcg/actuation nasal spray 2 sprays, Each Nostril, Daily    naproxen (Naprosyn)  500 mg tablet 1 tablet, oral, 2 times daily (morning and late afternoon)    olopatadine (Patanol) 0.1 % ophthalmic solution 1 drop, Both Eyes, 2 times daily PRN    oxybutynin (Ditropan) 5 mg tablet 1 tablet, oral, Daily    Ozempic 1 mg, subcutaneous, Once Weekly    PARoxetine (Paxil) 10 mg tablet Take by mouth.    progesterone (Prometrium) 100 mg capsule Take by mouth.    semaglutide 2 mg, subcutaneous, Weekly    zolpidem (Ambien) 5 mg tablet Take by mouth.        Estimated Needs:  Total Energy Estimated Needs in 24 hours (kCal): 1450 kCal; Method for Estimating Needs: MSJ 1429 x 1.2 - 250 (for weight loss)  Total Protein Estimated Needs in 24 Hours (g): 70 g; Method for Estimating 24 Hour Protein Needs: 0.8 g/kg   ;     ;                 Nutrition Diagnosis        Nutrition Diagnosis  Patient has Nutrition Diagnosis: Yes  Diagnosis Status (1): New  Nutrition Diagnosis 1: Altered nutrition related to laboratory values  Related to (1): DM  As Evidenced by (1): A1c = 14% (12/16/24)       Nutrition Interventions/Recommendations   Nutrition Prescription: Oral nutrition General Healthy diet with focus on DM management    Nutrition Interventions:   Food and Nutrient Delivery: Meals & Snacks: Carbohydrate-modified diet     Coordination of Care:       Dexcom G7 sample provided in office (Lot# [id number])     Nutrition Education:   Nutrition Education Content: Content related nutrition education  Demonstrated how to use CGM.   Provided education on what happens in the body when prediabetes and diabetes develop. Explained why providing consistent amounts of carbohydrates in the diet is helpful for regulating blood sugar. Encouraged choosing foods that contain minimally processed complex carbohydrates, such as high fiber whole grains, beans, starchy vegetables, whole fruits. Simple sugars from fruit juice, sugar-sweetened beverages, and foods with added sugar should be limited in the diet. Also discussed how foods like  protein, some fat, and non-starchy vegetables impact blood sugar regulation.  Provided education on Plate Method as a tool for preparing balanced meals. Discussed the following: Fill 1/2 plate with non-starchy vegetables (broccoli, carrots, cauliflower, salad greens, cucumbers, tomatoes). These foods contribute very few carbohydrates, and they add fiber to the meal. Fill 1/4 plate with lean protein (meat, turkey, fish, seafood, eggs, nuts, cheese, cottage cheese, nut butter, tofu, edamame). Fill 1/4 plate with carbohydrates/starches (grains, fruits, starchy vegetables, beans, yogurt, milk). Aim for at least half of daily grains as whole grains.   Provided education on carbohydrate (carb) counting. Discussed the following:  Foods and beverages that contribute carbohydrates (fruits, grains, legumes, milk, yogurt, starchy vegetables, sugar added to foods, sugar-sweetened beverages)  Foods that contribute no or minimal carbohydrates (protein, fats, non-starchy vegetables)  How to use portions of food that are 15 grams of carbohydrate to facilitate counting; Gave examples of portions  How to read a food label to count carbohydrates  How to use tools to help with carb counting (phone apps, food scales)  Advised patient to aim for 30-45 grams of carbohydrate per meal and 15-20 grams of carbohydrate per snack    Educational Handouts Provided: ADA Plate Method, [facility] Counting Carbs Guide     Nutrition Counseling:   Nutrition Counseling Strategies : Nutrition counseling based on motivational interviewing strategy, Nutrition counseling based on goal setting strategy    Goals:   Try using Stevia or Truvia in your coffee instead of regular sugar  Eat a consistent breakfast that is protein-based (eggs, yogurt, nuts)        Nutrition Monitoring and Evaluation   Food and Nutrient Intake  Monitoring and Evaluation Plan: Meal/snack pattern  Meal/Snack Pattern: Estimated meal and snack pattern  Criteria: Work towards 3 balanced meals per  day using plate method & 1-2 balanced snacks a day         Anthropometric measurements  Monitoring and Evaluation Plan: Weight  Criteria: Intentional weight loss of 0.5-2 lb per week, trending toward a clinically significant weight loss of 5-10% of current body weight.    Biochemical Data, Medical Tests and Procedures  Monitoring and Evaluation Plan: Glucose/endocrine profile, Lipid profile  Glucose/Endocrine Profile: Hemoglobin A1c (HgbA1c)  Criteria: <7%  Criteria: LDL < 70 mg/dL, TG < 150         Goal Status:           Follow Up: 3 months

## 2025-01-30 NOTE — PATIENT INSTRUCTIONS
[facility] and Clarity to monitor your blood sugar levels    Goals:   Try using Stevia or Truvia in your coffee instead of regular sugar  Eat a consistent breakfast that is protein-based (eggs, yogurt, nuts)

## 2025-01-31 VITALS — HEIGHT: 64 IN | BODY MASS INDEX: 33.85 KG/M2

## 2025-02-05 ENCOUNTER — APPOINTMENT (OUTPATIENT)
Dept: OPHTHALMOLOGY | Facility: CLINIC | Age: 65
End: 2025-02-05
Payer: COMMERCIAL

## 2025-02-05 DIAGNOSIS — M35.00 SJOGREN'S SYNDROME, WITH UNSPECIFIED ORGAN INVOLVEMENT (MULTI): Primary | ICD-10-CM

## 2025-02-05 PROCEDURE — 99213 OFFICE O/P EST LOW 20 MIN: CPT | Performed by: OPHTHALMOLOGY

## 2025-02-05 ASSESSMENT — TONOMETRY
OS_IOP_MMHG: 19
OD_IOP_MMHG: 19
IOP_METHOD: GOLDMANN APPLANATION

## 2025-02-05 ASSESSMENT — ENCOUNTER SYMPTOMS: EYES NEGATIVE: 1

## 2025-02-05 ASSESSMENT — VISUAL ACUITY
OS_SC: 20/30
METHOD: SNELLEN - LINEAR
OD_SC: 20/25
OS_SC+: -2

## 2025-02-05 ASSESSMENT — SLIT LAMP EXAM - LIDS
COMMENTS: GOOD POSITION, MGD 2+ UL AND LL
COMMENTS: GOOD POSITION, MGD 2+ UL AND LL

## 2025-02-05 ASSESSMENT — CUP TO DISC RATIO
OD_RATIO: 0.5
OS_RATIO: 0.5

## 2025-02-05 ASSESSMENT — EXTERNAL EXAM - LEFT EYE: OS_EXAM: NORMAL

## 2025-02-05 ASSESSMENT — EXTERNAL EXAM - RIGHT EYE: OD_EXAM: NORMAL

## 2025-02-08 NOTE — PROGRESS NOTES
Assessment/Plan   Diagnoses and all orders for this visit:  Sjogren's syndrome, with unspecified organ involvement (Multi)  Feeling better after starting Restasis BID  No punctate epithelial erosions (PEE) today, started Restasis BID 2 months ago.    Plan:  Keep cycloSPORINE (Restasis) 0.05 % ophthalmic emulsion; Administer 1 drop into both eyes 2 times a day.  Ats PRN  See in 6 months.

## 2025-02-13 ENCOUNTER — APPOINTMENT (OUTPATIENT)
Dept: PRIMARY CARE | Facility: CLINIC | Age: 65
End: 2025-02-13
Payer: COMMERCIAL

## 2025-02-13 VITALS — BODY MASS INDEX: 32.41 KG/M2 | TEMPERATURE: 98.6 F | WEIGHT: 188.8 LBS

## 2025-02-13 DIAGNOSIS — Z79.4 TYPE 2 DIABETES MELLITUS WITH HYPERGLYCEMIA, WITH LONG-TERM CURRENT USE OF INSULIN: ICD-10-CM

## 2025-02-13 DIAGNOSIS — E11.65 TYPE 2 DIABETES MELLITUS WITH HYPERGLYCEMIA, WITH LONG-TERM CURRENT USE OF INSULIN: ICD-10-CM

## 2025-02-13 DIAGNOSIS — I10 PRIMARY HYPERTENSION: Primary | ICD-10-CM

## 2025-02-13 DIAGNOSIS — E78.49 OTHER HYPERLIPIDEMIA: ICD-10-CM

## 2025-02-13 DIAGNOSIS — E13.69 OTHER SPECIFIED DIABETES MELLITUS WITH OTHER SPECIFIED COMPLICATION, UNSPECIFIED WHETHER LONG TERM INSULIN USE (MULTI): ICD-10-CM

## 2025-02-13 LAB
POC FINGERSTICK BLOOD GLUCOSE: 92 MG/DL (ref 70–100)
POC HEMOGLOBIN A1C: 12 % (ref 4.2–6.5)

## 2025-02-13 PROCEDURE — 82962 GLUCOSE BLOOD TEST: CPT | Performed by: INTERNAL MEDICINE

## 2025-02-13 PROCEDURE — 99214 OFFICE O/P EST MOD 30 MIN: CPT | Performed by: INTERNAL MEDICINE

## 2025-02-13 PROCEDURE — 83036 HEMOGLOBIN GLYCOSYLATED A1C: CPT | Performed by: INTERNAL MEDICINE

## 2025-02-13 RX ORDER — BLOOD-GLUCOSE SENSOR
EACH MISCELLANEOUS
Qty: 4 EACH | Refills: 2 | Status: SHIPPED | OUTPATIENT
Start: 2025-02-13

## 2025-02-13 RX ORDER — BLOOD-GLUCOSE,RECEIVER,CONT
EACH MISCELLANEOUS
Qty: 1 EACH | Refills: 0 | Status: SHIPPED | OUTPATIENT
Start: 2025-02-13

## 2025-02-13 RX ORDER — METFORMIN HYDROCHLORIDE 500 MG/1
1000 TABLET ORAL
Qty: 400 TABLET | Refills: 3 | Status: SHIPPED | OUTPATIENT
Start: 2025-02-13 | End: 2026-03-20

## 2025-02-13 NOTE — PROGRESS NOTES
Adeola Moody is a 64 y.o. female   Patient with a past medical history of HTN, HLD, DM, RA, Sjogren, Depression, DJD, Colonoscopy in 2030, Mammogram (Nov)    Sugars have been high  Feels thirsty all the time  Drinks a lot of pop         Review of Systems     Constitutional: not feeling poorly, no fever, no recent weight gain and no recent weight loss.   Eyes: no blurred vision and no diplopia.   ENT: no hearing loss, no tinnitus, no earache, no sore throat, no hoarseness and no swollen glands in the neck.   Cardiovascular: no chest pain, no tightness or heavy pressure, no shortness of breath, no palpitations and no lower extremity edema.   Respiratory: no cough, wheezing or shortness of breath at rest or exertion  Gastrointestinal: no change in bowel habits, no diarrhea, no constipation, no bloody stools, no nausea, no vomiting, no abdominal pain, no signs and symptoms of ulcer disease, no cali colored stools and no intolerance to fatty foods.   Genitourinary: no dysuria, no hematuria, no burning sensation during urination, urinary stream is not smaller and urinary stream does not start and stop.   Musculoskeletal: no arthralgias,  no muscle weakness, no back pain and no difficulty walking.   Skin: no rashes, no change in skin color and pigmentation, no skin lesions and no skin lumps.   Neurological: no headaches, no dizziness, no seizures, no tingling, no numbness, no signs and symptoms of stroke and no limb weakness.   Psychiatric: no confusion, no memory lapses or loss, no depression and no sleep disturbances.   Endocrine: ,  no dry skin, no cold intolerance, no heat intolerance a  Hematologic/Lymphatic: is not slow to heal, does not bleed easily, does not bruise easily, no thrombophlebitis, no anemia and no history of blood transfusion.   All other systems have been reviewed and are negative for complaint.     Current Outpatient Medications   Medication Instructions    acetaminophen (Tylenol 8 HOUR)  650 mg ER tablet 1 tablet, 3 times daily (0900,1400,1900)    albuterol 90 mcg/actuation inhaler INHALE 1-2 PUFS EVERY 4 TO 6 HOURS AS NEEDED FOR WHEEZING OR SHORTNESS OF BREATH FOR UP TO 30 DAYS    amLODIPine-benazepriL (Lotrel) 10-40 mg capsule 1 capsule, oral, Daily    atorvastatin (Lipitor) 10 mg tablet 1 tablet, Daily    atorvastatin (LIPITOR) 20 mg, oral, Daily    benzonatate (Tessalon) 100 mg capsule SWALLOW WHOLE TAKE 1 CAPSULE 3 TIMES A DAY AS NEEDED *DO NOT BREAK CHEW, DISSOLVE, CUT, OR CRUSH*    cyclobenzaprine (FLEXERIL) 10 mg, Every 8 hours PRN    cycloSPORINE (Restasis) 0.05 % ophthalmic emulsion 1 drop, Both Eyes, 2 times daily    dextran 70-hypromellose, PF, (Bion Tears) 0.1-0.3 % ophthalmic solution 1 drop, Both Eyes, 4 times daily    diazePAM (Valium) 5 mg tablet Take by mouth.    diclofenac sodium (VOLTAREN ARTHRITIS PAIN) 4 g, Topical, 4 times daily    diclofenac sodium 1.5 % drops Place on the skin.    estradiol (Estrace) 0.01 % (0.1 mg/gram) vaginal cream Insert into the vagina.    fluticasone (Flonase) 50 mcg/actuation nasal spray 1 spray, 2 times daily    FreeStyle Lite Strips strip 2 times daily    furosemide (Lasix) 20 mg tablet     gabapentin (Neurontin) 100 mg capsule     ibuprofen 600 mg, Every 6 hours PRN    insulin glargine (LANTUS) 20 Units, subcutaneous, Nightly, Take as directed per insulin instructions.    methocarbamol (ROBAXIN) 750 mg, 4 times daily    mirabegron (Mybetriq) 25 mg tablet extended release 24 hr 24 hr tablet Take by mouth.    mometasone (Nasonex) 50 mcg/actuation nasal spray 2 sprays, Each Nostril, Daily    naproxen (Naprosyn) 500 mg tablet 1 tablet, 2 times daily (morning and late afternoon)    olopatadine (Patanol) 0.1 % ophthalmic solution 1 drop, Both Eyes, 2 times daily PRN    oxybutynin (Ditropan) 5 mg tablet 1 tablet, Daily    Ozempic 1 mg, subcutaneous, Once Weekly    PARoxetine (Paxil) 10 mg tablet Take by mouth.    progesterone (Prometrium) 100 mg capsule  Take by mouth.    semaglutide 2 mg, subcutaneous, Weekly    zolpidem (Ambien) 5 mg tablet Take by mouth.       Vitals:    02/13/25 1632   Temp: 37 °C (98.6 °F)          Physical Exam     Constitutional   General appearance: Alert and in no acute distress.     Pulmonary   Respiratory assessment: No respiratory distress, normal respiratory rhythm and effort.    Auscultation of Lungs: Clear bilateral breath sounds.   Cardiovascular   Auscultation of heart: Apical pulse normal, heart rate and rhythm normal, normal S1 and S2, no murmurs and no pericardial rub.    Exam for edema: No peripheral edema.   Abdomen   Abdominal Exam: No bruits, normal bowel sounds, soft, non-tender, no abdominal mass palpated.    Liver and Spleen exam: No hepato-splenomegaly.   Musculoskeletal   Examination of gait: Normal.    Inspection of digits and nails: No clubbing or cyanosis of the fingernails.    Inspection/palpation of joints, bones and muscles: No joint swelling. Normal movement of all extremities.   Skin   Skin inspection: Normal skin color and pigmentation, normal skin turgor and no visible rash.   Neurologic   Cranial nerves: Nerves 2-12 were intact, no focal neuro defects.       Assessment/Plan      Problem List Items Addressed This Visit       Other specified diabetes mellitus with other specified complication, unspecified whether long term insulin use (Multi)    Relevant Orders    POCT fingerstick glucose manually resulted (Completed)    POCT glycosylated hemoglobin (Hb A1C) manually resulted          Patient with a past medical history of HTN, HLD, DM, RA, Sjogren, Depression, DJD, Colonoscopy in 2030, Mammogram (Nov)        # DM/ Morbid Obesity  A1c of 12  Increase Ozempic to 2 mg  Restart Metformin  Apparently pharmacist told her  the the Insulin is not covered  Has a DEXcom now  Getting good readings on it  Check C peptide  JENNIFER  Blood work            # HTN  Stable  Continue current medications           # HLD  Cont Rx  Watch  salt, avoid excessive caffeine  Avoid processed meats/ sugars/juices Instead eat fresh fruit  Add walnuts and almonds to your diet  exercise 6 days a week for 30 minutes           # Rheumatoid Arthritis/ Sjogrens  Saw Rheumatology   recommend to stay off meds as no active disease    Blood work  CT calcium score pending

## 2025-02-15 DIAGNOSIS — E78.49 OTHER HYPERLIPIDEMIA: ICD-10-CM

## 2025-02-17 RX ORDER — ATORVASTATIN CALCIUM 20 MG/1
20 TABLET, FILM COATED ORAL DAILY
Qty: 90 TABLET | Refills: 2 | Status: SHIPPED | OUTPATIENT
Start: 2025-02-17

## 2025-02-19 ENCOUNTER — HOSPITAL ENCOUNTER (OUTPATIENT)
Dept: RADIOLOGY | Facility: CLINIC | Age: 65
Discharge: HOME | End: 2025-02-19
Payer: COMMERCIAL

## 2025-02-19 DIAGNOSIS — M25.551 RIGHT HIP PAIN: ICD-10-CM

## 2025-02-19 PROCEDURE — 73502 X-RAY EXAM HIP UNI 2-3 VIEWS: CPT | Mod: RT

## 2025-02-20 LAB
ALBUMIN SERPL-MCNC: 4.5 G/DL (ref 3.6–5.1)
ALBUMIN/CREAT UR: 15 MG/G CREAT
ALP SERPL-CCNC: 64 U/L (ref 37–153)
ALT SERPL-CCNC: 26 U/L (ref 6–29)
ANION GAP SERPL CALCULATED.4IONS-SCNC: 9 MMOL/L (CALC) (ref 7–17)
AST SERPL-CCNC: 20 U/L (ref 10–35)
BILIRUB SERPL-MCNC: 0.5 MG/DL (ref 0.2–1.2)
BUN SERPL-MCNC: 13 MG/DL (ref 7–25)
C PEPTIDE SERPL-MCNC: 4.36 NG/ML (ref 0.8–3.85)
CALCIUM SERPL-MCNC: 9.4 MG/DL (ref 8.6–10.4)
CHLORIDE SERPL-SCNC: 108 MMOL/L (ref 98–110)
CHOLEST SERPL-MCNC: 146 MG/DL
CHOLEST/HDLC SERPL: 2.9 (CALC)
CO2 SERPL-SCNC: 24 MMOL/L (ref 20–32)
CREAT SERPL-MCNC: 0.89 MG/DL (ref 0.5–1.05)
CREAT UR-MCNC: 363 MG/DL (ref 20–275)
EGFRCR SERPLBLD CKD-EPI 2021: 72 ML/MIN/1.73M2
ERYTHROCYTE [DISTWIDTH] IN BLOOD BY AUTOMATED COUNT: 13.4 % (ref 11–15)
GAD65 AB SER IA-ACNC: NORMAL
GLUCOSE SERPL-MCNC: 110 MG/DL (ref 65–99)
HCT VFR BLD AUTO: 39.7 % (ref 35–45)
HDLC SERPL-MCNC: 51 MG/DL
HGB BLD-MCNC: 13.1 G/DL (ref 11.7–15.5)
LDLC SERPL CALC-MCNC: 80 MG/DL (CALC)
MCH RBC QN AUTO: 30.1 PG (ref 27–33)
MCHC RBC AUTO-ENTMCNC: 33 G/DL (ref 32–36)
MCV RBC AUTO: 91.3 FL (ref 80–100)
MICROALBUMIN UR-MCNC: 5.3 MG/DL
NONHDLC SERPL-MCNC: 95 MG/DL (CALC)
PLATELET # BLD AUTO: 293 THOUSAND/UL (ref 140–400)
PMV BLD REES-ECKER: 9.8 FL (ref 7.5–12.5)
POTASSIUM SERPL-SCNC: 3.9 MMOL/L (ref 3.5–5.3)
PROT SERPL-MCNC: 7.4 G/DL (ref 6.1–8.1)
RBC # BLD AUTO: 4.35 MILLION/UL (ref 3.8–5.1)
SODIUM SERPL-SCNC: 141 MMOL/L (ref 135–146)
TRIGL SERPL-MCNC: 66 MG/DL
TSH SERPL-ACNC: 2.37 MIU/L (ref 0.4–4.5)
WBC # BLD AUTO: 4.8 THOUSAND/UL (ref 3.8–10.8)

## 2025-02-20 RX ORDER — BLOOD-GLUCOSE SENSOR
EACH MISCELLANEOUS
Qty: 4 EACH | Refills: 2 | Status: SHIPPED | OUTPATIENT
Start: 2025-02-20

## 2025-02-22 ENCOUNTER — HOSPITAL ENCOUNTER (OUTPATIENT)
Dept: RADIOLOGY | Facility: HOSPITAL | Age: 65
Discharge: HOME | End: 2025-02-22
Payer: COMMERCIAL

## 2025-02-22 DIAGNOSIS — E78.49 OTHER HYPERLIPIDEMIA: ICD-10-CM

## 2025-02-22 PROCEDURE — 75571 CT HRT W/O DYE W/CA TEST: CPT

## 2025-02-24 LAB
ALBUMIN SERPL-MCNC: 4.5 G/DL (ref 3.6–5.1)
ALBUMIN/CREAT UR: 15 MG/G CREAT
ALP SERPL-CCNC: 64 U/L (ref 37–153)
ALT SERPL-CCNC: 26 U/L (ref 6–29)
ANION GAP SERPL CALCULATED.4IONS-SCNC: 9 MMOL/L (CALC) (ref 7–17)
AST SERPL-CCNC: 20 U/L (ref 10–35)
BILIRUB SERPL-MCNC: 0.5 MG/DL (ref 0.2–1.2)
BUN SERPL-MCNC: 13 MG/DL (ref 7–25)
C PEPTIDE SERPL-MCNC: 4.36 NG/ML (ref 0.8–3.85)
CALCIUM SERPL-MCNC: 9.4 MG/DL (ref 8.6–10.4)
CHLORIDE SERPL-SCNC: 108 MMOL/L (ref 98–110)
CHOLEST SERPL-MCNC: 146 MG/DL
CHOLEST/HDLC SERPL: 2.9 (CALC)
CO2 SERPL-SCNC: 24 MMOL/L (ref 20–32)
CREAT SERPL-MCNC: 0.89 MG/DL (ref 0.5–1.05)
CREAT UR-MCNC: 363 MG/DL (ref 20–275)
EGFRCR SERPLBLD CKD-EPI 2021: 72 ML/MIN/1.73M2
ERYTHROCYTE [DISTWIDTH] IN BLOOD BY AUTOMATED COUNT: 13.4 % (ref 11–15)
GAD65 AB SER IA-ACNC: <5 IU/ML
GLUCOSE SERPL-MCNC: 110 MG/DL (ref 65–99)
HCT VFR BLD AUTO: 39.7 % (ref 35–45)
HDLC SERPL-MCNC: 51 MG/DL
HGB BLD-MCNC: 13.1 G/DL (ref 11.7–15.5)
LDLC SERPL CALC-MCNC: 80 MG/DL (CALC)
MCH RBC QN AUTO: 30.1 PG (ref 27–33)
MCHC RBC AUTO-ENTMCNC: 33 G/DL (ref 32–36)
MCV RBC AUTO: 91.3 FL (ref 80–100)
MICROALBUMIN UR-MCNC: 5.3 MG/DL
NONHDLC SERPL-MCNC: 95 MG/DL (CALC)
PLATELET # BLD AUTO: 293 THOUSAND/UL (ref 140–400)
PMV BLD REES-ECKER: 9.8 FL (ref 7.5–12.5)
POTASSIUM SERPL-SCNC: 3.9 MMOL/L (ref 3.5–5.3)
PROT SERPL-MCNC: 7.4 G/DL (ref 6.1–8.1)
RBC # BLD AUTO: 4.35 MILLION/UL (ref 3.8–5.1)
SODIUM SERPL-SCNC: 141 MMOL/L (ref 135–146)
TRIGL SERPL-MCNC: 66 MG/DL
TSH SERPL-ACNC: 2.37 MIU/L (ref 0.4–4.5)
WBC # BLD AUTO: 4.8 THOUSAND/UL (ref 3.8–10.8)

## 2025-03-11 DIAGNOSIS — E13.69 OTHER SPECIFIED DIABETES MELLITUS WITH OTHER SPECIFIED COMPLICATION, UNSPECIFIED WHETHER LONG TERM INSULIN USE (MULTI): ICD-10-CM

## 2025-03-11 DIAGNOSIS — I10 PRIMARY HYPERTENSION: ICD-10-CM

## 2025-03-11 RX ORDER — SEMAGLUTIDE 2.68 MG/ML
2 INJECTION, SOLUTION SUBCUTANEOUS
Qty: 3 ML | Refills: 2 | Status: SHIPPED | OUTPATIENT
Start: 2025-03-11

## 2025-03-26 ENCOUNTER — APPOINTMENT (OUTPATIENT)
Dept: ENDOCRINOLOGY | Facility: CLINIC | Age: 65
End: 2025-03-26
Payer: COMMERCIAL

## 2025-05-15 ENCOUNTER — APPOINTMENT (OUTPATIENT)
Dept: PRIMARY CARE | Facility: CLINIC | Age: 65
End: 2025-05-15
Payer: COMMERCIAL

## 2025-06-09 DIAGNOSIS — I10 PRIMARY HYPERTENSION: ICD-10-CM

## 2025-06-09 RX ORDER — AMLODIPINE AND BENAZEPRIL HYDROCHLORIDE 10; 40 MG/1; MG/1
1 CAPSULE ORAL DAILY
Qty: 90 CAPSULE | Refills: 1 | Status: SHIPPED | OUTPATIENT
Start: 2025-06-09

## 2025-06-16 ENCOUNTER — APPOINTMENT (OUTPATIENT)
Dept: PRIMARY CARE | Facility: CLINIC | Age: 65
End: 2025-06-16
Payer: COMMERCIAL

## 2025-06-16 VITALS
WEIGHT: 181.8 LBS | RESPIRATION RATE: 16 BRPM | DIASTOLIC BLOOD PRESSURE: 90 MMHG | TEMPERATURE: 98.1 F | SYSTOLIC BLOOD PRESSURE: 130 MMHG | BODY MASS INDEX: 31.21 KG/M2 | HEART RATE: 72 BPM

## 2025-06-16 DIAGNOSIS — Z00.00 ANNUAL PHYSICAL EXAM: ICD-10-CM

## 2025-06-16 DIAGNOSIS — J45.20 MILD INTERMITTENT ASTHMA WITHOUT COMPLICATION (HHS-HCC): ICD-10-CM

## 2025-06-16 DIAGNOSIS — E13.69 OTHER SPECIFIED DIABETES MELLITUS WITH OTHER SPECIFIED COMPLICATION, UNSPECIFIED WHETHER LONG TERM INSULIN USE (MULTI): ICD-10-CM

## 2025-06-16 DIAGNOSIS — Z11.59 NEED FOR HEPATITIS C SCREENING TEST: Primary | ICD-10-CM

## 2025-06-16 DIAGNOSIS — I10 PRIMARY HYPERTENSION: ICD-10-CM

## 2025-06-16 DIAGNOSIS — M06.9 RHEUMATOID ARTHRITIS, INVOLVING UNSPECIFIED SITE, UNSPECIFIED WHETHER RHEUMATOID FACTOR PRESENT (MULTI): ICD-10-CM

## 2025-06-16 DIAGNOSIS — E78.49 OTHER HYPERLIPIDEMIA: ICD-10-CM

## 2025-06-16 LAB — POC FINGERSTICK BLOOD GLUCOSE: 87 MG/DL (ref 70–100)

## 2025-06-16 ASSESSMENT — PATIENT HEALTH QUESTIONNAIRE - PHQ9
SUM OF ALL RESPONSES TO PHQ9 QUESTIONS 1 AND 2: 0
1. LITTLE INTEREST OR PLEASURE IN DOING THINGS: NOT AT ALL
2. FEELING DOWN, DEPRESSED OR HOPELESS: NOT AT ALL

## 2025-06-16 NOTE — PROGRESS NOTES
Adeola Moody is a 64 y.o. female   Patient with a past medical history of HTN, HLD, DM, RA, Sjogren, Depression, DJD, Colonoscopy in 2030, Mammogram (Nov)    Feels fine  No chest pain/  SOB/ dizziness  BM OK  Energy level ok  Appetite OK    Average blood sugars  No polyuria  No polydipsia  Nocturia            Review of Systems     Constitutional: not feeling poorly, no fever, no recent weight gain and no recent weight loss.   Eyes: no blurred vision and no diplopia.   ENT: no hearing loss, no tinnitus, no earache, no sore throat, no hoarseness and no swollen glands in the neck.   Cardiovascular: no chest pain, no tightness or heavy pressure, no shortness of breath, no palpitations and no lower extremity edema.   Respiratory: no cough, wheezing or shortness of breath at rest or exertion  Gastrointestinal: no change in bowel habits, no diarrhea, no constipation, no bloody stools, no nausea, no vomiting, no abdominal pain, no signs and symptoms of ulcer disease, no cali colored stools and no intolerance to fatty foods.   Genitourinary: no urinary frequency, no dysuria, no hematuria, no burning sensation during urination, urinary stream is not smaller and urinary stream does not start and stop.   Musculoskeletal: no arthralgias, no joint stiffness, no muscle weakness, no back pain and no difficulty walking.   Skin: no rashes, no change in skin color and pigmentation, no skin lesions and no skin lumps.   Neurological: no headaches, no dizziness, no seizures, no tingling, no numbness, no signs and symptoms of stroke and no limb weakness.   Psychiatric: no confusion, no memory lapses or loss, no depression and no sleep disturbances.   Endocrine: no goiter, no thyroid disorder, no excessive thirst, no dry skin, no cold intolerance, no heat intolerance and no increased urinary frequency.   Hematologic/Lymphatic: is not slow to heal, does not bleed easily, does not bruise easily, no thrombophlebitis, no anemia and  no history of blood transfusion.   All other systems have been reviewed and are negative for complaint.     Current Outpatient Medications   Medication Instructions    acetaminophen (Tylenol 8 HOUR) 650 mg ER tablet 1 tablet, 3 times daily (0900,1400,1900)    albuterol 90 mcg/actuation inhaler INHALE 1-2 PUFS EVERY 4 TO 6 HOURS AS NEEDED FOR WHEEZING OR SHORTNESS OF BREATH FOR UP TO 30 DAYS    amLODIPine-benazepriL (Lotrel) 10-40 mg capsule 1 capsule, oral, Daily    atorvastatin (Lipitor) 10 mg tablet 1 tablet, Daily    atorvastatin (LIPITOR) 20 mg, oral, Daily    benzonatate (Tessalon) 100 mg capsule SWALLOW WHOLE TAKE 1 CAPSULE 3 TIMES A DAY AS NEEDED *DO NOT BREAK CHEW, DISSOLVE, CUT, OR CRUSH*    cyclobenzaprine (FLEXERIL) 10 mg, Every 8 hours PRN    cycloSPORINE (Restasis) 0.05 % ophthalmic emulsion 1 drop, Both Eyes, 2 times daily    Dexcom G7  misc Use as instructed    Dexcom G7 Sensor device USE AS DIRECTED    dextran 70-hypromellose, PF, (Bion Tears) 0.1-0.3 % ophthalmic solution 1 drop, Both Eyes, 4 times daily    diazePAM (Valium) 5 mg tablet Take by mouth.    diclofenac sodium (VOLTAREN ARTHRITIS PAIN) 4 g, Topical, 4 times daily    diclofenac sodium 1.5 % drops Place on the skin.    estradiol (Estrace) 0.01 % (0.1 mg/gram) vaginal cream Insert into the vagina.    fluticasone (Flonase) 50 mcg/actuation nasal spray 1 spray, 2 times daily    FreeStyle Lite Strips strip 2 times daily    furosemide (Lasix) 20 mg tablet     gabapentin (Neurontin) 100 mg capsule     ibuprofen 600 mg, Every 6 hours PRN    insulin glargine (LANTUS) 20 Units, subcutaneous, Nightly, Take as directed per insulin instructions.    metFORMIN (GLUCOPHAGE) 1,000 mg, oral, 2 times daily (morning and late afternoon)    methocarbamol (ROBAXIN) 750 mg, 4 times daily    mirabegron (Mybetriq) 25 mg tablet extended release 24 hr 24 hr tablet Take by mouth.    mometasone (Nasonex) 50 mcg/actuation nasal spray 2 sprays, Each Nostril,  Daily    naproxen (Naprosyn) 500 mg tablet 1 tablet, 2 times daily (morning and late afternoon)    olopatadine (Patanol) 0.1 % ophthalmic solution 1 drop, Both Eyes, 2 times daily PRN    oxybutynin (Ditropan) 5 mg tablet 1 tablet, Daily    Ozempic 2 mg, subcutaneous, Once Weekly    PARoxetine (Paxil) 10 mg tablet Take by mouth.    progesterone (Prometrium) 100 mg capsule Take by mouth.    zolpidem (Ambien) 5 mg tablet Take by mouth.       Vitals:    06/16/25 1639   BP: 130/90   Pulse: 72   Resp: 16   Temp: 36.7 °C (98.1 °F)        Physical Exam     Constitutional   General appearance: Alert and in no acute distress.   Eyes   Inspection of eyes: Sclera and conjunctiva were normal.    Pupil exam: Pupils were equal in size. Extraocular movements were intact.   Ears, Nose, Mouth, and Throat   Ears: Auricles: Normal.    Otoscopic examination: Tympanic membranes: Normal with no congestion and no discharge. Otic Canals: Normal without tenderness, congestion or discharge.    Oropharynx: Normal with moist mucus membranes, no congestion. Tonsils: Normal no follicles.    Hearing: Normal.     Nasal mucosa, septum, and turbinates: Normal without edema or erythema.    Lips, teeth, and gums: Normal.   Neck   Neck Exam: Appearance of the neck was normal. No neck masses observed.    Thyroid exam: Not enlarged and no palpable thyroid nodules.   Pulmonary   Respiratory assessment: No respiratory distress, normal respiratory rhythm and effort.    Auscultation of Lungs: Clear bilateral breath sounds.   Cardiovascular   Auscultation of heart: Apical pulse normal, heart rate and rhythm normal, normal S1 and S2, no murmurs and no pericardial rub.    Carotid arteries: Pulses normal with no bruits.    Femoral pulses: Normal without bruits.    Exam for edema: No peripheral edema.    Abdominal aorta: Normal.     Pedal pulses: 2+ bilaterally.   Chest   Breast inspection: NOT EXAMINED  Chest: Normal A_P diameter, no pulsation, no intercostal  withdrawing. Trachea central.   Abdomen   Abdominal Exam: No bruits, normal bowel sounds, soft, non-tender, no abdominal mass palpated.    Liver and Spleen exam: No hepato-splenomegaly.    Rectal exam: Deferred  Genitourinary   DEFER TO GYNECOLOGY    Lymphatic   Palpation of lymph nodes in neck: No cervical lymphadenopathy.   Musculoskeletal   Examination of gait: Normal.    Inspection of digits and nails: No clubbing or cyanosis of the fingernails.    Inspection/palpation of joints, bones and muscles: No joint swelling. Normal movement of all extremities.    Range of Motion: Normal movement of all extremities.    Assessment of Stability: Normal.    Muscle strength/tone: Normal.    Skin   Skin inspection: Normal skin color and pigmentation, normal skin turgor and no visible rash.    Examination of the skin for lesions: Normal.    Neurologic   Cranial nerves: Nerves 2-12 were intact, no focal neuro defects.    Cortical function: Normal.     Reflexes: Normal.     Sensation: Normal.     Coordination: Normal.    Psychiatric    Judgment and insight: Intact.    Orientation: Oriented to person, place, and time.    Mood and affect: Normal.    Recent and remote memory: Normal.          Assessment/Plan      Problem List Items Addressed This Visit       Other specified diabetes mellitus with other specified complication, unspecified whether long term insulin use (Multi)    Relevant Orders    POCT fingerstick glucose manually resulted (Completed)    Hyperlipidemia    Hypertension     Other Visit Diagnoses         Need for hepatitis C screening test    -  Primary    Relevant Orders    Hepatitis C antibody      Annual physical exam               Patient with a past medical history of HTN, HLD, DM, RA, Sjogren, Depression, DJD, Colonoscopy in 2030, Mammogram (Nov)        # DM/ Morbid Obesity   Ozempic to 2 mg  Metformin  Has a DEXcom now  Getting good readings on it  C-peptide and emma normal  Check A1c                #  HTN  Stable  Continue current medications           # HLD  Cont Rx  Calcium score 0  Watch salt, avoid excessive caffeine  Avoid processed meats/ sugars/juices Instead eat fresh fruit  Add walnuts and almonds to your diet  exercise 6 days a week for 30 minutes           # Rheumatoid Arthritis/ Sjogrens  Saw Rheumatology   recommend to stay off meds as no active disease    Refer to GYN    Patient on examination is in fair health except for medical conditions discussed above, obtained screening blood tests to screen for high cholesterol, diabetes, thyroid, Ekg if above 50 years old or earlier if with cardiac history. Patient should be taking enough calcium in a balanced diet Weight control especially if BMI is above ideal range. For Female Patients Only: Mammogram yearly and PAP test with gynecology unless s/p Total hysterectomy Bone density test at age 60 and above and every two years after that. Preventive Medicine: colon cancer screening by age 45 if no family history as well PSA @ 50 , balanced diet, and exercise as discussed. Seat belt use for injury prevention, living will. Substance use and /or tobacco use counseled when applicable. Alcohol use discussed, use designated . Immunizations TD age 50 and every 10 years. Pneumovax and shingles vaccine counseled. Yearly flu vaccine unless contraindicated. More than 50% of office visit time spent counseling the patient, questions were answered.   I have discussed with the patient the importance of vaccination for COVID-19, pneumonia , shingles and influenza. I explained that these vaccines can prevent serious illness and that they can protect patients, their families, friends, and communities, and keep them healthy.    If problems arise, patient is to call or come back in. It is understood that the responsibility of healthcare is shared by the patient by following a healthy lifestyle and following the plan above as discussed. Advised complete physical examination  every year. Pending additional results, may need to come for a return office visit for follow-up.   All questions and concerns were addressed.     # You should increase your intake of fresh fruits and vegetables. Try to consume at least 4 servings of such foods everyday.  # Try to add Fish to your diet , or start daily Omega 3 supplements  # You should increase your intake of unprocessed nuts like almonds and walnuts.  # Increase your plant based proteins.  # Use Polaris Oil.  # You should avoid fried foods and processed meats, and minimize eating in restaurants.  # Do not consume sugary or starchy foods and drinks. If the choice is between fresh fruit and juice, always choose fresh fruits.  # Limit alcoholic beverages to no more than one a day  # Try to avoid adding any salt to your food, limiting it to 2000 mg / day Use pepper and seasonings instead.  # Try to exercise 6 times a week, for a total of 150 minutes a week. Walking is a great exercise, but add some strength training too.    As your Primary Care doctor, I will be available to you at any time to answer any questions you have concerning your health.  Live Long and [place]!

## 2025-06-17 DIAGNOSIS — E13.69 OTHER SPECIFIED DIABETES MELLITUS WITH OTHER SPECIFIED COMPLICATION, UNSPECIFIED WHETHER LONG TERM INSULIN USE (MULTI): ICD-10-CM

## 2025-06-17 LAB
EST. AVERAGE GLUCOSE BLD GHB EST-MCNC: 120 MG/DL
EST. AVERAGE GLUCOSE BLD GHB EST-SCNC: 6.6 MMOL/L
HBA1C MFR BLD: 5.8 %
HCV AB SERPL QL IA: NORMAL
HIV 1+2 AB+HIV1 P24 AG SERPL QL IA: NORMAL

## 2025-06-17 RX ORDER — BLOOD-GLUCOSE SENSOR
EACH MISCELLANEOUS
Qty: 3 EACH | Refills: 3 | Status: SHIPPED | OUTPATIENT
Start: 2025-06-17

## 2025-06-17 NOTE — RESULT ENCOUNTER NOTE
Results reviewed.  All tests within acceptable range.  Please call if you have any questions or concerns.

## 2025-06-24 ENCOUNTER — APPOINTMENT (OUTPATIENT)
Dept: OPHTHALMOLOGY | Facility: CLINIC | Age: 65
End: 2025-06-24
Payer: COMMERCIAL

## 2025-08-17 DIAGNOSIS — I10 PRIMARY HYPERTENSION: ICD-10-CM

## 2025-08-17 DIAGNOSIS — E13.69 OTHER SPECIFIED DIABETES MELLITUS WITH OTHER SPECIFIED COMPLICATION, UNSPECIFIED WHETHER LONG TERM INSULIN USE (MULTI): ICD-10-CM

## 2025-08-19 RX ORDER — SEMAGLUTIDE 2.68 MG/ML
2 INJECTION, SOLUTION SUBCUTANEOUS
Qty: 3 ML | Refills: 2 | Status: SHIPPED | OUTPATIENT
Start: 2025-08-19

## 2025-09-18 ENCOUNTER — APPOINTMENT (OUTPATIENT)
Dept: PRIMARY CARE | Facility: CLINIC | Age: 65
End: 2025-09-18
Payer: COMMERCIAL